# Patient Record
Sex: FEMALE | Race: WHITE | NOT HISPANIC OR LATINO | Employment: FULL TIME | URBAN - NONMETROPOLITAN AREA
[De-identification: names, ages, dates, MRNs, and addresses within clinical notes are randomized per-mention and may not be internally consistent; named-entity substitution may affect disease eponyms.]

---

## 2020-06-01 ENCOUNTER — OFFICE VISIT (OUTPATIENT)
Dept: URGENT CARE | Facility: PHYSICIAN GROUP | Age: 30
End: 2020-06-01
Payer: COMMERCIAL

## 2020-06-01 VITALS
SYSTOLIC BLOOD PRESSURE: 118 MMHG | WEIGHT: 210.5 LBS | HEART RATE: 77 BPM | BODY MASS INDEX: 35.07 KG/M2 | HEIGHT: 65 IN | OXYGEN SATURATION: 98 % | TEMPERATURE: 98.3 F | RESPIRATION RATE: 16 BRPM | DIASTOLIC BLOOD PRESSURE: 70 MMHG

## 2020-06-01 DIAGNOSIS — H10.33 ACUTE BACTERIAL CONJUNCTIVITIS OF BOTH EYES: ICD-10-CM

## 2020-06-01 PROCEDURE — 99203 OFFICE O/P NEW LOW 30 MIN: CPT | Performed by: NURSE PRACTITIONER

## 2020-06-01 RX ORDER — POLYMYXIN B SULFATE AND TRIMETHOPRIM 1; 10000 MG/ML; [USP'U]/ML
1 SOLUTION OPHTHALMIC EVERY 4 HOURS
Qty: 10 ML | Refills: 0 | Status: SHIPPED | OUTPATIENT
Start: 2020-06-01 | End: 2021-12-16

## 2020-06-01 SDOH — HEALTH STABILITY: MENTAL HEALTH: HOW OFTEN DO YOU HAVE A DRINK CONTAINING ALCOHOL?: 2-4 TIMES A MONTH

## 2020-06-01 ASSESSMENT — ENCOUNTER SYMPTOMS
EYE REDNESS: 1
PHOTOPHOBIA: 0
NAUSEA: 0
DIZZINESS: 0
HEADACHES: 0
VOMITING: 0
BLURRED VISION: 0
EYE PAIN: 0
DOUBLE VISION: 0
EYE DISCHARGE: 1

## 2020-06-01 ASSESSMENT — VISUAL ACUITY: OU: 1

## 2020-06-01 NOTE — LETTER
June 1, 2020         Patient: Madhavi Martinez   YOB: 1990   Date of Visit: 6/1/2020           To Whom it May Concern:    Madhavi Martinez was seen in my clinic on 6/1/2020.     If you have any questions or concerns, please don't hesitate to call.        Sincerely,           ALCIRA Bland.  Electronically Signed

## 2020-06-01 NOTE — PROGRESS NOTES
Subjective:     Madhavi Martinez  is a 30 y.o. female who presents for Conjunctivitis (her 3 yr old had it last week)       Conjunctivitis   This is a new problem. The current episode started yesterday. The problem occurs constantly. The problem has been gradually worsening. Pertinent negatives include no headaches, nausea, rash or vomiting. Associated symptoms comments: 10-year-old female patient reports urgent care for new problem.  States that her son had pinkeye last week and she woke up 1 day ago with red eyes, greenish drainage in her eyes glued shut this morning.  Denies fever, chills, nausea or vomiting denies any dizziness or changes in vision.  Has not taken anything over-the-counter for symptoms. Nothing aggravates the symptoms. She has tried nothing for the symptoms.     Review of Systems   Eyes: Positive for discharge and redness. Negative for blurred vision, double vision, photophobia and pain.   Gastrointestinal: Negative for nausea and vomiting.   Skin: Negative for itching and rash.   Neurological: Negative for dizziness and headaches.     History reviewed. No pertinent past medical history. History reviewed. No pertinent surgical history.   Social History     Socioeconomic History   • Marital status: Single     Spouse name: Not on file   • Number of children: Not on file   • Years of education: Not on file   • Highest education level: Not on file   Occupational History   • Not on file   Social Needs   • Financial resource strain: Not on file   • Food insecurity     Worry: Not on file     Inability: Not on file   • Transportation needs     Medical: Not on file     Non-medical: Not on file   Tobacco Use   • Smoking status: Never Smoker   • Smokeless tobacco: Never Used   Substance and Sexual Activity   • Alcohol use: Yes     Frequency: 2-4 times a month   • Drug use: Never   • Sexual activity: Not on file   Lifestyle   • Physical activity     Days per week: Not on file     Minutes per session: Not on  "file   • Stress: Not on file   Relationships   • Social connections     Talks on phone: Not on file     Gets together: Not on file     Attends Oriental orthodox service: Not on file     Active member of club or organization: Not on file     Attends meetings of clubs or organizations: Not on file     Relationship status: Not on file   • Intimate partner violence     Fear of current or ex partner: Not on file     Emotionally abused: Not on file     Physically abused: Not on file     Forced sexual activity: Not on file   Other Topics Concern   • Not on file   Social History Narrative   • Not on file    Patient has no known allergies.     Objective:   /70   Pulse 77   Temp 36.8 °C (98.3 °F) (Temporal)   Resp 16   Ht 1.651 m (5' 5\")   Wt 95.5 kg (210 lb 8 oz)   SpO2 98%   BMI 35.03 kg/m²   Physical Exam  Vitals signs reviewed.   Constitutional:       Appearance: Normal appearance.   HENT:      Right Ear: Tympanic membrane, ear canal and external ear normal.      Left Ear: Tympanic membrane, ear canal and external ear normal.      Mouth/Throat:      Lips: Pink.      Pharynx: Uvula midline.   Eyes:      General: Vision grossly intact. Gaze aligned appropriately.         Right eye: Discharge present.         Left eye: Discharge present.     Extraocular Movements: Extraocular movements intact.      Conjunctiva/sclera:      Right eye: Right conjunctiva is injected. Exudate present.      Left eye: Left conjunctiva is injected. Exudate present.      Pupils: Pupils are equal, round, and reactive to light.   Cardiovascular:      Rate and Rhythm: Normal rate and regular rhythm.      Heart sounds: Normal heart sounds.   Pulmonary:      Effort: Pulmonary effort is normal.      Breath sounds: Normal breath sounds.   Skin:     General: Skin is warm.   Neurological:      Mental Status: She is alert and oriented to person, place, and time.   Psychiatric:         Mood and Affect: Mood normal.         Behavior: Behavior normal.       "   Thought Content: Thought content normal.         Judgment: Judgment normal.          Assessment/Plan:     1. Acute bacterial conjunctivitis of both eyes  - polymixin-trimethoprim (POLYTRIM) 46563-3.1 UNIT/ML-% Solution; Place 1 Drop in both eyes every 4 hours.  Dispense: 10 mL; Refill: 0    Discussed physical examination findings as well as patient presentation is consistent with acute bacterial conjunctivitis.  Will order a antibiotic eyedrop and discussed abortive care including good hand hygiene, warm water compresses as well as lubricating eyedrops.    Supportive care, differential diagnoses, and indications for immediate follow-up discussed with patient.    Pathogenesis of diagnosis discussed including typical length and natural progression. Patient expresses understanding and agrees to plan.    Instructed patient to return to clinic for worsening symptoms or symptoms that persist for 7 to 10 days     Work note provided    Please note that this dictation was created using voice recognition software. I have made every reasonable attempt to correct obvious errors, but I expect that there are errors of grammar and possibly content that I did not discover before finalizing the note.

## 2021-12-16 ENCOUNTER — OFFICE VISIT (OUTPATIENT)
Dept: URGENT CARE | Facility: PHYSICIAN GROUP | Age: 31
End: 2021-12-16
Payer: COMMERCIAL

## 2021-12-16 VITALS
RESPIRATION RATE: 16 BRPM | BODY MASS INDEX: 36.32 KG/M2 | HEIGHT: 65 IN | TEMPERATURE: 97.4 F | WEIGHT: 218 LBS | SYSTOLIC BLOOD PRESSURE: 104 MMHG | HEART RATE: 75 BPM | OXYGEN SATURATION: 97 % | DIASTOLIC BLOOD PRESSURE: 70 MMHG

## 2021-12-16 DIAGNOSIS — L50.9 URTICARIA: ICD-10-CM

## 2021-12-16 PROCEDURE — 99213 OFFICE O/P EST LOW 20 MIN: CPT | Performed by: NURSE PRACTITIONER

## 2021-12-16 RX ORDER — TIMOLOL MALEATE 5 MG/ML
SOLUTION/ DROPS OPHTHALMIC
COMMUNITY
Start: 2021-11-22 | End: 2021-12-16

## 2021-12-16 RX ORDER — HYDROXYZINE HYDROCHLORIDE 25 MG/1
25 TABLET, FILM COATED ORAL 3 TIMES DAILY PRN
Qty: 30 TABLET | Refills: 3 | Status: SHIPPED | OUTPATIENT
Start: 2021-12-16 | End: 2022-01-05

## 2021-12-16 RX ORDER — METHYLPREDNISOLONE 4 MG/1
TABLET ORAL
Qty: 21 TABLET | Refills: 0 | Status: SHIPPED | OUTPATIENT
Start: 2021-12-16 | End: 2022-01-05

## 2021-12-16 RX ORDER — ULIPRISTAL ACETATE 30 MG/1
TABLET ORAL
COMMUNITY
Start: 2021-11-22 | End: 2021-12-16

## 2021-12-16 ASSESSMENT — ENCOUNTER SYMPTOMS
CHILLS: 0
SORE THROAT: 0
FEVER: 0
DIZZINESS: 0
HEADACHES: 0

## 2021-12-16 NOTE — LETTER
December 16, 2021        Madhavi [address] [place] [address] [place] [address]        Madhavi was seen in our clinic today and she is excused from work for yesterday. She has a rash that is chronic but not contagious.   If you have any questions or concerns, please don't hesitate to call.        Sincerely,        DIANNE [email or web address].    Electronically Signed

## 2021-12-16 NOTE — PROGRESS NOTES
Subjective     Madhavi Martinez is a 31 y.o. female who presents with Rash (Uticartia flare up, x1day)            HPI   New problem.  Patient is a 31-year-old female who presents with a urticarial flare up since yesterday.  She reports that she has chronic hives and has had these for several years.  She reports breakout yesterday that started on her scalp and is in various locations.  The rash is itchy.  She denies fever, chills, joint pain, or sore throat.  She has been taking an  prescription of hydroxyzine for the symptoms.    Patient has no known allergies.  No current outpatient medications on file prior to visit.     No current facility-administered medications on file prior to visit.     Social History     Socioeconomic History   • Marital status: Single     Spouse name: Not on file   • Number of children: Not on file   • Years of education: Not on file   • Highest education level: Not on file   Occupational History   • Not on file   Tobacco Use   • Smoking status: Never Smoker   • Smokeless tobacco: Never Used   Substance and Sexual Activity   • Alcohol use: Yes   • Drug use: Never   • Sexual activity: Not on file   Other Topics Concern   • Not on file   Social History Narrative   • Not on file     Social Determinants of Health     Financial Resource Strain:    • Difficulty of Paying Living Expenses: Not on file   Food Insecurity:    • Worried About Running Out of Food in the Last Year: Not on file   • Ran Out of Food in the Last Year: Not on file   Transportation Needs:    • Lack of Transportation (Medical): Not on file   • Lack of Transportation (Non-Medical): Not on file   Physical Activity:    • Days of Exercise per Week: Not on file   • Minutes of Exercise per Session: Not on file   Stress:    • Feeling of Stress : Not on file   Social Connections:    • Frequency of Communication with Friends and Family: Not on file   • Frequency of Social Gatherings with Friends and Family: Not on file   •  "Attends Buddhism Services: Not on file   • Active Member of Clubs or Organizations: Not on file   • Attends Club or Organization Meetings: Not on file   • Marital Status: Not on file   Intimate Partner Violence:    • Fear of Current or Ex-Partner: Not on file   • Emotionally Abused: Not on file   • Physically Abused: Not on file   • Sexually Abused: Not on file   Housing Stability:    • Unable to Pay for Housing in the Last Year: Not on file   • Number of Places Lived in the Last Year: Not on file   • Unstable Housing in the Last Year: Not on file     Breast Cancer-related family history is not on file.      Review of Systems   Constitutional: Negative for chills, fever and malaise/fatigue.   HENT: Negative for sore throat.    Musculoskeletal: Negative for joint pain.   Skin: Positive for itching and rash.   Neurological: Negative for dizziness and headaches.              Objective     /70   Pulse 75   Temp 36.3 °C (97.4 °F) (Temporal)   Resp 16   Ht 1.651 m (5' 5\")   Wt 98.9 kg (218 lb)   SpO2 97%   BMI 36.28 kg/m²      Physical Exam  Vitals and nursing note reviewed.   Constitutional:       Appearance: Normal appearance. She is not ill-appearing.   Cardiovascular:      Rate and Rhythm: Normal rate and regular rhythm.      Heart sounds: No murmur heard.      Pulmonary:      Effort: Pulmonary effort is normal.      Breath sounds: Normal breath sounds.   Musculoskeletal:         General: Normal range of motion.   Skin:     General: Skin is warm and dry.      Findings: Rash present. Rash is urticarial.   Neurological:      General: No focal deficit present.      Mental Status: She is alert and oriented to person, place, and time.   Psychiatric:         Mood and Affect: Mood normal.                             Assessment & Plan        1. Urticaria  hydrOXYzine HCl (ATARAX) 25 MG Tab    methylPREDNISolone (MEDROL DOSEPAK) 4 MG Tablet Therapy Pack     Patient given refill on hydroxyzine as well as " medrol.  Follow up as needed.

## 2022-01-05 ENCOUNTER — OFFICE VISIT (OUTPATIENT)
Dept: URGENT CARE | Facility: PHYSICIAN GROUP | Age: 32
End: 2022-01-05
Payer: COMMERCIAL

## 2022-01-05 VITALS
OXYGEN SATURATION: 98 % | HEIGHT: 65 IN | DIASTOLIC BLOOD PRESSURE: 68 MMHG | TEMPERATURE: 97.6 F | WEIGHT: 212 LBS | BODY MASS INDEX: 35.32 KG/M2 | HEART RATE: 60 BPM | SYSTOLIC BLOOD PRESSURE: 110 MMHG | RESPIRATION RATE: 14 BRPM

## 2022-01-05 DIAGNOSIS — L50.9 HIVES OF UNKNOWN ORIGIN: ICD-10-CM

## 2022-01-05 DIAGNOSIS — R21 RASH: ICD-10-CM

## 2022-01-05 PROCEDURE — 99213 OFFICE O/P EST LOW 20 MIN: CPT | Performed by: FAMILY MEDICINE

## 2022-01-05 RX ORDER — PREDNISONE 20 MG/1
40 TABLET ORAL EVERY MORNING
Qty: 12 TABLET | Refills: 0 | Status: SHIPPED | OUTPATIENT
Start: 2022-01-05 | End: 2022-01-11

## 2022-01-05 RX ORDER — MONTELUKAST SODIUM 10 MG/1
10 TABLET ORAL EVERY EVENING
Qty: 30 TABLET | Refills: 0 | Status: SHIPPED | OUTPATIENT
Start: 2022-01-05 | End: 2022-02-07 | Stop reason: SDUPTHER

## 2022-01-05 NOTE — PROGRESS NOTES
"Subjective     Madhavi Martinez is a 32 y.o. female who presents with Rash (chronic uticaria pt states, follow up, medication reaction with cream prescribed)    - This is a pleasant and nontoxic appearing 32 y.o. female who has come to the walk-in clinic today for:    #1) on/off hives for acpl years. Has been acting up a lot past 2 weeks. Itchy hives, move around.     Seen recently in  and given Medrol spike, notes reviewed. Says this helped a lot.     No associated NVFC      ALLERGIES:  Patient has no known allergies.     PMH:  History reviewed. No pertinent past medical history.     PSH:  History reviewed. No pertinent surgical history.    MEDS:    Current Outpatient Medications:   •  predniSONE (DELTASONE) 20 MG Tab, Take 2 Tablets by mouth every morning for 6 days., Disp: 12 Tablet, Rfl: 0  •  montelukast (SINGULAIR) 10 MG Tab, Take 1 Tablet by mouth every evening., Disp: 30 Tablet, Rfl: 0    ** I have documented what I find to be significant in regards to past medical, social, family and surgical history  in my HPI or under PMH/PSH/FH review section, otherwise it is noncontributory **             HPI    Review of Systems   Skin: Positive for itching and rash.   All other systems reviewed and are negative.             Objective     /68   Pulse 60   Temp 36.4 °C (97.6 °F)   Resp 14   Ht 1.651 m (5' 5\")   Wt 96.2 kg (212 lb)   SpO2 98%   BMI 35.28 kg/m²      Physical Exam  Vitals and nursing note reviewed.   Constitutional:       General: She is not in acute distress.     Appearance: Normal appearance. She is well-developed.   HENT:      Head: Normocephalic and atraumatic.      Mouth/Throat:      Mouth: Mucous membranes are moist.      Pharynx: Oropharynx is clear.   Eyes:      General: No scleral icterus.  Cardiovascular:      Heart sounds: Normal heart sounds. No murmur heard.      Pulmonary:      Effort: Pulmonary effort is normal. No respiratory distress.   Skin:     Coloration: Skin is not " jaundiced or pale.      Findings: Rash ( hive like rash on neck, upper back and upper arms ) present.   Neurological:      Mental Status: She is alert.      Motor: No abnormal muscle tone.   Psychiatric:         Mood and Affect: Mood normal.         Behavior: Behavior normal.         Assessment & Plan       1. Rash  predniSONE (DELTASONE) 20 MG Tab    montelukast (SINGULAIR) 10 MG Tab    Referral to Allergy    Referral to Dermatology   2. Hives of unknown origin  predniSONE (DELTASONE) 20 MG Tab    montelukast (SINGULAIR) 10 MG Tab    Referral to Allergy    Referral to Dermatology       - Dx, plan & d/c instructions discussed   - Rest, stay hydrated, OTC Xyzal daily  - E.R. precautions discussed     Asked to kindly follow up with their PCP's office in 5-7 days for a recheck, ER if not improving or feeling/getting worse.    Any realistic side effects of medications that may have been given today reviewed.     Patient left in stable condition     Pertinent prior office visit notes in [facility] have been reviewed by me today on day of this visit.

## 2022-01-13 ENCOUNTER — OFFICE VISIT (OUTPATIENT)
Dept: MEDICAL GROUP | Facility: PHYSICIAN GROUP | Age: 32
End: 2022-01-13
Payer: COMMERCIAL

## 2022-01-13 VITALS
SYSTOLIC BLOOD PRESSURE: 122 MMHG | OXYGEN SATURATION: 97 % | HEIGHT: 65 IN | DIASTOLIC BLOOD PRESSURE: 80 MMHG | WEIGHT: 219.4 LBS | HEART RATE: 73 BPM | TEMPERATURE: 96.8 F | BODY MASS INDEX: 36.55 KG/M2 | RESPIRATION RATE: 16 BRPM

## 2022-01-13 DIAGNOSIS — M25.50 ARTHRALGIA, UNSPECIFIED JOINT: ICD-10-CM

## 2022-01-13 DIAGNOSIS — R23.9 UNSPECIFIED SKIN CHANGES: ICD-10-CM

## 2022-01-13 DIAGNOSIS — Z76.89 ENCOUNTER TO ESTABLISH CARE: ICD-10-CM

## 2022-01-13 DIAGNOSIS — E66.9 OBESITY (BMI 30-39.9): ICD-10-CM

## 2022-01-13 DIAGNOSIS — E55.9 VITAMIN D DEFICIENCY: ICD-10-CM

## 2022-01-13 DIAGNOSIS — Z13.220 SCREENING, LIPID: ICD-10-CM

## 2022-01-13 DIAGNOSIS — R21 RASH AND OTHER NONSPECIFIC SKIN ERUPTION: ICD-10-CM

## 2022-01-13 DIAGNOSIS — R53.82 CHRONIC FATIGUE: ICD-10-CM

## 2022-01-13 DIAGNOSIS — R23.9 RECENT SKIN CHANGES: ICD-10-CM

## 2022-01-13 PROCEDURE — 99214 OFFICE O/P EST MOD 30 MIN: CPT | Performed by: FAMILY MEDICINE

## 2022-01-13 RX ORDER — TRIAMCINOLONE ACETONIDE 1 MG/G
OINTMENT TOPICAL 2 TIMES DAILY
COMMUNITY
End: 2022-02-28

## 2022-01-13 RX ORDER — HYDROXYZINE HYDROCHLORIDE 25 MG/1
25 TABLET, FILM COATED ORAL 3 TIMES DAILY PRN
COMMUNITY
End: 2022-02-07 | Stop reason: SDUPTHER

## 2022-01-13 RX ORDER — PREDNISONE 20 MG/1
20 TABLET ORAL DAILY
COMMUNITY
End: 2022-02-28

## 2022-01-13 ASSESSMENT — PATIENT HEALTH QUESTIONNAIRE - PHQ9
CLINICAL INTERPRETATION OF PHQ2 SCORE: 2
SUM OF ALL RESPONSES TO PHQ QUESTIONS 1-9: 7
5. POOR APPETITE OR OVEREATING: 1 - SEVERAL DAYS

## 2022-01-13 NOTE — LETTER
[facility]  Pcp Pt States None  No address on file  Fax: [phone]   Authorization for Release/Disclosure of   Protected Health Information   Name: SLOANE SHIELDS : 1990 SSN: [id number]   Address: [address] [place] [address] [address] [place] [address] Phone:    [phone] (home)    I authorize the entity listed below to release/disclose the PHI below to:   [facility]/Pcp Pt States None and Lex Guevara M.D.   Provider or Entity Name:     Address   City, State, [facility]   Phone:      Fax:     Reason for request: continuity of care   Information to be released:    [  ] LAST COLONOSCOPY,  including any PATH REPORT and follow-up  [  ] LAST FIT/COLOGUARD RESULT [  ] LAST DEXA  [  ] LAST MAMMOGRAM  [  ] LAST PAP  [  ] LAST LABS [  ] RETINA EXAM REPORT  [  ] IMMUNIZATION RECORDS  [  ] Release all info      [  ] Check here and initial the line next to each item to release ALL health information INCLUDING  _____ Care and treatment for drug and / or alcohol abuse  _____ HIV testing, infection status, or AIDS  _____ Genetic Testing    DATES OF SERVICE OR TIME PERIOD TO BE DISCLOSED: _____________  I understand and acknowledge that:  * This Authorization may be revoked at any time by you in writing, except if your health information has already been used or disclosed.  * Your health information that will be used or disclosed as a result of you signing this authorization could be re-disclosed by the recipient. If this occurs, your re-disclosed health information may no longer be protected by State or Federal laws.  * You may refuse to sign this Authorization. Your refusal will not affect your ability to obtain treatment.  * This Authorization becomes effective upon signing and will  on (date) __________.      If no date is indicated, this Authorization will  one (1) year from the signature date.    Name: Sloane Shields    Signature:   Date:     2022       PLEASE FAX REQUESTED RECORDS BACK TO: [phone]

## 2022-01-13 NOTE — ASSESSMENT & PLAN NOTE
Patient with intermittent episodes or flares of urticaria, burning erythema, hives, joint pain: hands, elbows, wrists, knees, whole body pain and feverish, trouble sleeping with the pain. Eyelids will swell shut.   Sometimes she has migraines, ear pain, has no vision loss.     This started after her husbands suicide Jan 2018. Episodes started 3 weeks after his death when his family started to take her to court trying to get custody of her child who is currently 5 yrs old. Since then relationship with her inlaws have improved, but the symptoms and flares continue.   Stress or any fatigue will trigger her symptoms.   She also gained weight after the stress from her husbands passing.   Will check baseline labs as well as esr, crp, rf, ccp, alejandro  She has appts and referral with derm and allergy/immunology

## 2022-01-14 NOTE — PROGRESS NOTES
"Subjective:   Madhavi Martinez is a 32 y.o. female here today for evaluation and management of:     Unspecified skin changes  Patient with intermittent episodes or flares of urticaria, burning erythema, hives, joint pain: hands, elbows, wrists, knees, whole body pain and feverish, trouble sleeping with the pain. Eyelids will swell shut.   Sometimes she has migraines, ear pain, has no vision loss.     This started after her husbands suicide Jan 2018. Episodes started 3 weeks after his death when his family started to take her to court trying to get custody of her child who is currently 5 yrs old. Since then relationship with her inlaws have improved, but the symptoms and flares continue.   Stress or any fatigue will trigger her symptoms.   She also gained weight after the stress from her husbands passing.   Will check baseline labs as well as esr, crp, rf, ccp, alejandro  She has appts and referral with derm and allergy/immunology         Current medicines (including changes today)  Current Outpatient Medications   Medication Sig Dispense Refill   • predniSONE (DELTASONE) 20 MG Tab Take 20 mg by mouth every day.     • hydrOXYzine HCl (ATARAX) 25 MG Tab Take 25 mg by mouth 3 times a day as needed for Itching.     • triamcinolone acetonide (KENALOG) 0.1 % Ointment Apply  topically 2 times a day.     • montelukast (SINGULAIR) 10 MG Tab Take 1 Tablet by mouth every evening. 30 Tablet 0     No current facility-administered medications for this visit.     She  has no past medical history on file.    ROS  No chest pain, no shortness of breath, no abdominal pain       Objective:     /80   Pulse 73   Temp 36 °C (96.8 °F) (Temporal)   Resp 16   Ht 1.651 m (5' 5\")   Wt 99.5 kg (219 lb 6.4 oz)   SpO2 97%  Body mass index is 36.51 kg/m².   Physical Exam:  Constitutional: Alert, no distress.  Skin: Warm, dry, good turgor, no rashes in visible areas.  Eye: Equal, round and reactive, conjunctiva clear, lids normal.  ENMT: Lips " without lesions, good dentition, oropharynx clear.  Neck: Trachea midline, no masses, no thyromegaly. No cervical or supraclavicular lymphadenopathy  Respiratory: Unlabored respiratory effort, lungs clear to auscultation, no wheezes, no ronchi.  Cardiovascular: Normal S1, S2, no murmur, no edema.  Abdomen: Soft, non-tender, no masses, no hepatosplenomegaly.  Psych: Alert and oriented x3, normal affect and mood.        Assessment and Plan:   The following treatment plan was discussed    1. Encounter to establish care      2. Chronic fatigue    - CBC WITH DIFFERENTIAL; Future  - Comp Metabolic Panel; Future  - TSH WITH REFLEX TO FT4; Future    3. Vitamin D deficiency    - VITAMIN D,25 HYDROXY; Future    4. Screening, lipid    - Lipid Profile; Future    5. Recent skin changes    - CBC WITH DIFFERENTIAL; Future  - Comp Metabolic Panel; Future  - TSH WITH REFLEX TO FT4; Future  - URINALYSIS,CULTURE IF INDICATED; Future    6. Unspecified skin changes    - URINALYSIS,CULTURE IF INDICATED; Future    7. Rash and other nonspecific skin eruption    - Sed Rate; Future  - CRP QUANTITIVE (NON-CARDIAC); Future  - RHEUMATOID ARTHRITIS FACTOR; Future  - CCP ANTIBODY; Future  - DENISSE REFLEXIVE PROFILE; Future  - LYME DISEASE AB TOTAL/IGM  - URINALYSIS,CULTURE IF INDICATED; Future    8. Arthralgia, unspecified joint    - Sed Rate; Future  - CRP QUANTITIVE (NON-CARDIAC); Future  - RHEUMATOID ARTHRITIS FACTOR; Future  - CCP ANTIBODY; Future  - DENISSE REFLEXIVE PROFILE; Future  - LYME DISEASE AB TOTAL/IGM  - URINALYSIS,CULTURE IF INDICATED; Future    9. Obesity (BMI 30-39.9)    - Patient identified as having weight management issue.  Appropriate orders and counseling given.      Followup: Return in about 3 months (around 4/13/2022), or if symptoms worsen or fail to improve.

## 2022-01-22 ENCOUNTER — HOSPITAL ENCOUNTER (OUTPATIENT)
Dept: LAB | Facility: MEDICAL CENTER | Age: 32
End: 2022-01-22
Attending: FAMILY MEDICINE
Payer: COMMERCIAL

## 2022-01-22 DIAGNOSIS — R53.82 CHRONIC FATIGUE: ICD-10-CM

## 2022-01-22 DIAGNOSIS — M25.50 ARTHRALGIA, UNSPECIFIED JOINT: ICD-10-CM

## 2022-01-22 DIAGNOSIS — E55.9 VITAMIN D DEFICIENCY: ICD-10-CM

## 2022-01-22 DIAGNOSIS — Z13.220 SCREENING, LIPID: ICD-10-CM

## 2022-01-22 DIAGNOSIS — R21 RASH AND OTHER NONSPECIFIC SKIN ERUPTION: ICD-10-CM

## 2022-01-22 DIAGNOSIS — R23.9 RECENT SKIN CHANGES: ICD-10-CM

## 2022-01-22 DIAGNOSIS — R23.9 UNSPECIFIED SKIN CHANGES: ICD-10-CM

## 2022-01-22 LAB
25(OH)D3 SERPL-MCNC: 20 NG/ML (ref 30–100)
ALBUMIN SERPL BCP-MCNC: 4.2 G/DL (ref 3.2–4.9)
ALBUMIN/GLOB SERPL: 1.4 G/DL
ALP SERPL-CCNC: 68 U/L (ref 30–99)
ALT SERPL-CCNC: 16 U/L (ref 2–50)
ANION GAP SERPL CALC-SCNC: 12 MMOL/L (ref 7–16)
APPEARANCE UR: CLEAR
AST SERPL-CCNC: 19 U/L (ref 12–45)
BACTERIA #/AREA URNS HPF: ABNORMAL /HPF
BASOPHILS # BLD AUTO: 0.4 % (ref 0–1.8)
BASOPHILS # BLD: 0.04 K/UL (ref 0–0.12)
BILIRUB SERPL-MCNC: 0.2 MG/DL (ref 0.1–1.5)
BILIRUB UR QL STRIP.AUTO: NEGATIVE
BUN SERPL-MCNC: 14 MG/DL (ref 8–22)
CALCIUM SERPL-MCNC: 9.1 MG/DL (ref 8.5–10.5)
CHLORIDE SERPL-SCNC: 107 MMOL/L (ref 96–112)
CHOLEST SERPL-MCNC: 186 MG/DL (ref 100–199)
CO2 SERPL-SCNC: 21 MMOL/L (ref 20–33)
COLOR UR: YELLOW
CREAT SERPL-MCNC: 0.69 MG/DL (ref 0.5–1.4)
CRP SERPL HS-MCNC: 2.07 MG/DL (ref 0–0.75)
EOSINOPHIL # BLD AUTO: 0.22 K/UL (ref 0–0.51)
EOSINOPHIL NFR BLD: 2 % (ref 0–6.9)
EPI CELLS #/AREA URNS HPF: ABNORMAL /HPF
ERYTHROCYTE [DISTWIDTH] IN BLOOD BY AUTOMATED COUNT: 44 FL (ref 35.9–50)
ERYTHROCYTE [SEDIMENTATION RATE] IN BLOOD BY WESTERGREN METHOD: 19 MM/HOUR (ref 0–25)
FASTING STATUS PATIENT QL REPORTED: NORMAL
GLOBULIN SER CALC-MCNC: 3 G/DL (ref 1.9–3.5)
GLUCOSE SERPL-MCNC: 110 MG/DL (ref 65–99)
GLUCOSE UR STRIP.AUTO-MCNC: NEGATIVE MG/DL
HCT VFR BLD AUTO: 38.2 % (ref 37–47)
HDLC SERPL-MCNC: 52 MG/DL
HGB BLD-MCNC: 12.5 G/DL (ref 12–16)
HYALINE CASTS #/AREA URNS LPF: ABNORMAL /LPF
IMM GRANULOCYTES # BLD AUTO: 0.03 K/UL (ref 0–0.11)
IMM GRANULOCYTES NFR BLD AUTO: 0.3 % (ref 0–0.9)
KETONES UR STRIP.AUTO-MCNC: NEGATIVE MG/DL
LDLC SERPL CALC-MCNC: 104 MG/DL
LEUKOCYTE ESTERASE UR QL STRIP.AUTO: ABNORMAL
LYMPHOCYTES # BLD AUTO: 3.32 K/UL (ref 1–4.8)
LYMPHOCYTES NFR BLD: 29.5 % (ref 22–41)
MCH RBC QN AUTO: 29.5 PG (ref 27–33)
MCHC RBC AUTO-ENTMCNC: 32.7 G/DL (ref 33.6–35)
MCV RBC AUTO: 90.1 FL (ref 81.4–97.8)
MICRO URNS: ABNORMAL
MONOCYTES # BLD AUTO: 0.69 K/UL (ref 0–0.85)
MONOCYTES NFR BLD AUTO: 6.1 % (ref 0–13.4)
NEUTROPHILS # BLD AUTO: 6.95 K/UL (ref 2–7.15)
NEUTROPHILS NFR BLD: 61.7 % (ref 44–72)
NITRITE UR QL STRIP.AUTO: NEGATIVE
NRBC # BLD AUTO: 0 K/UL
NRBC BLD-RTO: 0 /100 WBC
PH UR STRIP.AUTO: 5.5 [PH] (ref 5–8)
PLATELET # BLD AUTO: 311 K/UL (ref 164–446)
PMV BLD AUTO: 10.7 FL (ref 9–12.9)
POTASSIUM SERPL-SCNC: 4.1 MMOL/L (ref 3.6–5.5)
PROT SERPL-MCNC: 7.2 G/DL (ref 6–8.2)
PROT UR QL STRIP: NEGATIVE MG/DL
RBC # BLD AUTO: 4.24 M/UL (ref 4.2–5.4)
RBC # URNS HPF: ABNORMAL /HPF
RBC UR QL AUTO: NEGATIVE
RHEUMATOID FACT SER IA-ACNC: <10 IU/ML (ref 0–14)
SODIUM SERPL-SCNC: 140 MMOL/L (ref 135–145)
SP GR UR STRIP.AUTO: 1.02
TRIGL SERPL-MCNC: 148 MG/DL (ref 0–149)
TSH SERPL DL<=0.005 MIU/L-ACNC: 1.75 UIU/ML (ref 0.38–5.33)
UROBILINOGEN UR STRIP.AUTO-MCNC: 0.2 MG/DL
WBC # BLD AUTO: 11.3 K/UL (ref 4.8–10.8)
WBC #/AREA URNS HPF: ABNORMAL /HPF

## 2022-01-22 PROCEDURE — 82306 VITAMIN D 25 HYDROXY: CPT

## 2022-01-22 PROCEDURE — 80061 LIPID PANEL: CPT

## 2022-01-22 PROCEDURE — 86431 RHEUMATOID FACTOR QUANT: CPT

## 2022-01-22 PROCEDURE — 81001 URINALYSIS AUTO W/SCOPE: CPT

## 2022-01-22 PROCEDURE — 86140 C-REACTIVE PROTEIN: CPT

## 2022-01-22 PROCEDURE — 86200 CCP ANTIBODY: CPT

## 2022-01-22 PROCEDURE — 85025 COMPLETE CBC W/AUTO DIFF WBC: CPT

## 2022-01-22 PROCEDURE — 85652 RBC SED RATE AUTOMATED: CPT

## 2022-01-22 PROCEDURE — 86038 ANTINUCLEAR ANTIBODIES: CPT

## 2022-01-22 PROCEDURE — 80053 COMPREHEN METABOLIC PANEL: CPT

## 2022-01-22 PROCEDURE — 36415 COLL VENOUS BLD VENIPUNCTURE: CPT

## 2022-01-22 PROCEDURE — 84443 ASSAY THYROID STIM HORMONE: CPT

## 2022-01-25 LAB
CCP IGG SERPL-ACNC: 32 UNITS (ref 0–19)
NUCLEAR IGG SER QL IA: NORMAL

## 2022-01-27 DIAGNOSIS — M79.641 PAIN IN BOTH HANDS: ICD-10-CM

## 2022-01-27 DIAGNOSIS — M79.642 PAIN IN BOTH HANDS: ICD-10-CM

## 2022-01-27 DIAGNOSIS — G89.29 CHRONIC BILATERAL LOW BACK PAIN WITHOUT SCIATICA: ICD-10-CM

## 2022-01-27 DIAGNOSIS — M54.50 CHRONIC BILATERAL LOW BACK PAIN WITHOUT SCIATICA: ICD-10-CM

## 2022-01-27 DIAGNOSIS — M79.672 PAIN IN BOTH FEET: ICD-10-CM

## 2022-01-27 DIAGNOSIS — M79.671 PAIN IN BOTH FEET: ICD-10-CM

## 2022-01-27 NOTE — RESULT ENCOUNTER NOTE
Released to Juaquin Hendrickson,  Your labs show high CCP and CRP these are indicative of possibly Rheumatoid arthritis or psoriatic arthritis. I've ordered xrays of hands, feet and SI joints which are required before referral to rheumatology.   Let me know when these are done and I will place the rheum referral. All other labs look good.   Lex Guevara M.D.

## 2022-02-07 DIAGNOSIS — L50.9 HIVES OF UNKNOWN ORIGIN: ICD-10-CM

## 2022-02-07 DIAGNOSIS — R21 RASH AND OTHER NONSPECIFIC SKIN ERUPTION: ICD-10-CM

## 2022-02-07 DIAGNOSIS — R21 RASH: ICD-10-CM

## 2022-02-07 RX ORDER — MONTELUKAST SODIUM 10 MG/1
10 TABLET ORAL EVERY EVENING
Qty: 30 TABLET | Refills: 0 | Status: SHIPPED | OUTPATIENT
Start: 2022-02-07 | End: 2022-03-29

## 2022-02-07 RX ORDER — HYDROXYZINE HYDROCHLORIDE 25 MG/1
25 TABLET, FILM COATED ORAL 3 TIMES DAILY PRN
Qty: 30 TABLET | Refills: 3 | Status: SHIPPED | OUTPATIENT
Start: 2022-02-07 | End: 2022-02-08 | Stop reason: SDUPTHER

## 2022-02-07 NOTE — TELEPHONE ENCOUNTER
Received request via: Patient    Was the patient seen in the last year in this department? Yes   Last OV 1/13/22    Does the patient have an active prescription (recently filled or refills available) for medication(s) requested? No    Requested Prescriptions     Pending Prescriptions Disp Refills   • hydrOXYzine HCl (ATARAX) 25 MG Tab 30 Tablet 3     Sig: Take 1 Tablet by mouth 3 times a day as needed for Itching.   • montelukast (SINGULAIR) 10 MG Tab 30 Tablet 0     Sig: Take 1 Tablet by mouth every evening.       
[phone]

## 2022-02-08 DIAGNOSIS — R21 RASH AND OTHER NONSPECIFIC SKIN ERUPTION: ICD-10-CM

## 2022-02-08 RX ORDER — HYDROXYZINE HYDROCHLORIDE 25 MG/1
25 TABLET, FILM COATED ORAL 3 TIMES DAILY PRN
Qty: 30 TABLET | Refills: 3 | Status: SHIPPED | OUTPATIENT
Start: 2022-02-08 | End: 2022-09-22 | Stop reason: SDUPTHER

## 2022-02-11 ENCOUNTER — APPOINTMENT (OUTPATIENT)
Dept: RADIOLOGY | Facility: MEDICAL CENTER | Age: 32
End: 2022-02-11
Attending: FAMILY MEDICINE
Payer: COMMERCIAL

## 2022-02-11 ENCOUNTER — APPOINTMENT (RX ONLY)
Dept: URBAN - METROPOLITAN AREA CLINIC 22 | Facility: CLINIC | Age: 32
Setting detail: DERMATOLOGY
End: 2022-02-11

## 2022-02-11 ENCOUNTER — RX ONLY (OUTPATIENT)
Age: 32
Setting detail: RX ONLY
End: 2022-02-11

## 2022-02-11 DIAGNOSIS — L50.1 IDIOPATHIC URTICARIA: ICD-10-CM

## 2022-02-11 PROCEDURE — ? ADDITIONAL NOTES

## 2022-02-11 PROCEDURE — ? REFERRAL

## 2022-02-11 PROCEDURE — 99204 OFFICE O/P NEW MOD 45 MIN: CPT

## 2022-02-11 PROCEDURE — ? PRESCRIPTION

## 2022-02-11 PROCEDURE — ? COUNSELING

## 2022-02-11 PROCEDURE — ? TREATMENT REGIMEN

## 2022-02-11 RX ORDER — CETIRIZINE HCL 1 MG/ML
SOLUTION, ORAL ORAL QD
Qty: 60 | Refills: 5 | Status: ERX | COMMUNITY
Start: 2022-02-11

## 2022-02-11 RX ORDER — FAMOTIDINE 20 MG/1
1 TABLET, FILM COATED ORAL BID
Qty: 60 | Refills: 5 | Status: ERX | COMMUNITY
Start: 2022-02-11

## 2022-02-11 RX ORDER — FAMOTIDINE 40 MG/1
TABLET, FILM COATED ORAL BID
Qty: 60 | Refills: 0 | Status: CANCELLED

## 2022-02-11 RX ADMIN — Medication 1: at 00:00

## 2022-02-11 ASSESSMENT — LOCATION DETAILED DESCRIPTION DERM
LOCATION DETAILED: LEFT ANTERIOR DISTAL THIGH
LOCATION DETAILED: LEFT ANTERIOR DISTAL UPPER ARM
LOCATION DETAILED: RIGHT DISTAL POSTERIOR THIGH
LOCATION DETAILED: RIGHT ANTERIOR DISTAL THIGH
LOCATION DETAILED: LEFT DISTAL POSTERIOR THIGH
LOCATION DETAILED: LEFT LATERAL ABDOMEN
LOCATION DETAILED: LEFT MEDIAL UPPER BACK

## 2022-02-11 ASSESSMENT — LOCATION SIMPLE DESCRIPTION DERM
LOCATION SIMPLE: LEFT POSTERIOR THIGH
LOCATION SIMPLE: LEFT THIGH
LOCATION SIMPLE: LEFT UPPER ARM
LOCATION SIMPLE: RIGHT THIGH
LOCATION SIMPLE: RIGHT POSTERIOR THIGH
LOCATION SIMPLE: ABDOMEN
LOCATION SIMPLE: LEFT UPPER BACK

## 2022-02-11 ASSESSMENT — LOCATION ZONE DERM
LOCATION ZONE: TRUNK
LOCATION ZONE: ARM
LOCATION ZONE: LEG

## 2022-02-11 NOTE — HPI: RASH
What Type Of Note Output Would You Prefer (Optional)?: Bullet Format
Is The Patient Presenting As Previously Scheduled?: Yes
How Severe Is Your Rash?: severe
Is This A New Presentation, Or A Follow-Up?: Referral for Rash
Who Is Your Referring Provider?: Urgent care

## 2022-02-11 NOTE — PROCEDURE: ADDITIONAL NOTES
Additional Notes: Has had urticaria on and off since 2018.  Had been clear for 1.5 years until the last few months when she started getting them again\\nStates she saw her allergist but was unable to get resolution.   Itch precedes rash/welts.   Scratching causes welts. \\nWould like to see new allergist as well. \\nStart Zyrtec BID and Pepcid BID.   Sent to pharmacy.
Detail Level: Simple
Render Risk Assessment In Note?: no

## 2022-02-11 NOTE — PROCEDURE: TREATMENT REGIMEN
Plan: Coordinate care with a different allergist as she is currently not happy with current one. Pepcid bid and Zyrtec bid
Initiate Treatment: Zyrtec BID, Pepcid
Detail Level: Zone

## 2022-02-28 ENCOUNTER — OFFICE VISIT (OUTPATIENT)
Dept: URGENT CARE | Facility: PHYSICIAN GROUP | Age: 32
End: 2022-02-28
Payer: COMMERCIAL

## 2022-02-28 VITALS
RESPIRATION RATE: 15 BRPM | TEMPERATURE: 97.6 F | SYSTOLIC BLOOD PRESSURE: 108 MMHG | HEIGHT: 63 IN | WEIGHT: 221 LBS | DIASTOLIC BLOOD PRESSURE: 76 MMHG | OXYGEN SATURATION: 98 % | HEART RATE: 87 BPM | BODY MASS INDEX: 39.16 KG/M2

## 2022-02-28 DIAGNOSIS — R21 RASH AND NONSPECIFIC SKIN ERUPTION: ICD-10-CM

## 2022-02-28 PROCEDURE — 99213 OFFICE O/P EST LOW 20 MIN: CPT | Performed by: PHYSICIAN ASSISTANT

## 2022-02-28 RX ORDER — TRIAMCINOLONE ACETONIDE 40 MG/ML
40 INJECTION, SUSPENSION INTRA-ARTICULAR; INTRAMUSCULAR ONCE
Status: COMPLETED | OUTPATIENT
Start: 2022-02-28 | End: 2022-02-28

## 2022-02-28 RX ADMIN — TRIAMCINOLONE ACETONIDE 40 MG: 40 INJECTION, SUSPENSION INTRA-ARTICULAR; INTRAMUSCULAR at 12:52

## 2022-02-28 ASSESSMENT — ENCOUNTER SYMPTOMS
VOMITING: 0
FEVER: 0
CHILLS: 0
NAUSEA: 0

## 2022-02-28 ASSESSMENT — FIBROSIS 4 INDEX: FIB4 SCORE: 0.46

## 2022-02-28 NOTE — PROGRESS NOTES
"Subjective:   Madhavi Martinez  is a 32 y.o. female who presents for Rash (Rash on back)      Rash  Pertinent negatives include no fever or vomiting.   Patient presents urgent care noting flareup of her urticarial rash. She notes past medical history of dermatographia. She has seen allergist and dermatologist. Both of discussed trial of a triamcinolone injection. Patient has recently been treated with prednisone orally. She reports some challenging feelings of anxiety and depression exacerbated while taking that medication. She does regularly use antihistamines for her rash. She does have a follow-up with dermatology. She is on FMLA from work. She requests a triamcinolone injection while in clinic today.    Review of Systems   Constitutional: Negative for chills and fever.   Gastrointestinal: Negative for nausea and vomiting.   Skin: Positive for itching and rash.       No Known Allergies     Objective:   /76   Pulse 87   Temp 36.4 °C (97.6 °F) (Temporal)   Resp 15   Ht 1.6 m (5' 3\")   Wt 100 kg (221 lb)   SpO2 98%   BMI 39.15 kg/m²     Physical Exam  Vitals and nursing note reviewed.   Constitutional:       General: She is not in acute distress.     Appearance: She is well-developed. She is not diaphoretic.   HENT:      Head: Normocephalic and atraumatic.      Right Ear: External ear normal.      Left Ear: External ear normal.      Nose: Nose normal.   Eyes:      General: Lids are normal. No scleral icterus.        Right eye: No discharge.         Left eye: No discharge.      Conjunctiva/sclera: Conjunctivae normal.   Pulmonary:      Effort: Pulmonary effort is normal. No respiratory distress.   Musculoskeletal:         General: Normal range of motion.      Cervical back: Neck supple.   Skin:     General: Skin is warm and dry.      Coloration: Skin is not pale.      Findings: Erythema and rash present. Rash is urticarial. Rash is not crusting, pustular, scaling or vesicular.   Neurological:      Mental " Status: She is alert and oriented to person, place, and time. She is not disoriented.   Psychiatric:         Speech: Speech normal.         Behavior: Behavior normal.       Kenalog 40mg / ml - tolerates well    Assessment/Plan:   1. Rash and nonspecific skin eruption  - triamcinolone acetonide (KENALOG-40) injection 40 mg    -Follow up with allergist and dermatology  -Continue antihistamines  -Return to clinic with lack of resolution or progression of symptoms.      I have worn an N95 mask, gloves and eye protection for the entire encounter with this patient.     Differential diagnosis, natural history, supportive care, and indications for immediate follow-up discussed.

## 2022-03-26 ENCOUNTER — HOSPITAL ENCOUNTER (OUTPATIENT)
Dept: RADIOLOGY | Facility: MEDICAL CENTER | Age: 32
End: 2022-03-26
Attending: FAMILY MEDICINE
Payer: COMMERCIAL

## 2022-03-26 DIAGNOSIS — G89.29 CHRONIC BILATERAL LOW BACK PAIN WITHOUT SCIATICA: ICD-10-CM

## 2022-03-26 DIAGNOSIS — M54.50 CHRONIC BILATERAL LOW BACK PAIN WITHOUT SCIATICA: ICD-10-CM

## 2022-03-26 DIAGNOSIS — M79.641 PAIN IN BOTH HANDS: ICD-10-CM

## 2022-03-26 DIAGNOSIS — M79.671 PAIN IN BOTH FEET: ICD-10-CM

## 2022-03-26 DIAGNOSIS — M79.672 PAIN IN BOTH FEET: ICD-10-CM

## 2022-03-26 DIAGNOSIS — M79.642 PAIN IN BOTH HANDS: ICD-10-CM

## 2022-03-26 PROCEDURE — 72202 X-RAY EXAM SI JOINTS 3/> VWS: CPT

## 2022-03-26 PROCEDURE — 77077 JOINT SURVEY SINGLE VIEW: CPT

## 2022-03-29 DIAGNOSIS — L50.9 HIVES OF UNKNOWN ORIGIN: ICD-10-CM

## 2022-03-29 DIAGNOSIS — M79.641 PAIN IN BOTH HANDS: ICD-10-CM

## 2022-03-29 DIAGNOSIS — M79.671 PAIN IN BOTH FEET: ICD-10-CM

## 2022-03-29 DIAGNOSIS — Z99.2 CCPD (CONTINUOUS CYCLING PERITONEAL DIALYSIS) STATUS (HCC): ICD-10-CM

## 2022-03-29 DIAGNOSIS — R76.8 POSITIVE ANTI-CCP TEST: ICD-10-CM

## 2022-03-29 DIAGNOSIS — R53.82 CHRONIC FATIGUE: ICD-10-CM

## 2022-03-29 DIAGNOSIS — R21 RASH AND OTHER NONSPECIFIC SKIN ERUPTION: ICD-10-CM

## 2022-03-29 DIAGNOSIS — M79.672 PAIN IN BOTH FEET: ICD-10-CM

## 2022-03-29 DIAGNOSIS — M79.642 PAIN IN BOTH HANDS: ICD-10-CM

## 2022-03-30 RX ORDER — CYCLOBENZAPRINE HCL 5 MG
5 TABLET ORAL 3 TIMES DAILY PRN
Qty: 90 TABLET | Refills: 0 | Status: SHIPPED | OUTPATIENT
Start: 2022-03-30 | End: 2022-08-04

## 2022-04-14 ENCOUNTER — OFFICE VISIT (OUTPATIENT)
Dept: MEDICAL GROUP | Facility: PHYSICIAN GROUP | Age: 32
End: 2022-04-14
Payer: COMMERCIAL

## 2022-04-14 VITALS
SYSTOLIC BLOOD PRESSURE: 120 MMHG | HEART RATE: 84 BPM | DIASTOLIC BLOOD PRESSURE: 78 MMHG | WEIGHT: 224 LBS | BODY MASS INDEX: 36 KG/M2 | OXYGEN SATURATION: 100 % | TEMPERATURE: 98.5 F | HEIGHT: 66 IN

## 2022-04-14 DIAGNOSIS — Z80.6 FAMILY HISTORY OF LEUKEMIA: ICD-10-CM

## 2022-04-14 DIAGNOSIS — Z23 NEED FOR VACCINATION: ICD-10-CM

## 2022-04-14 DIAGNOSIS — E66.9 OBESITY (BMI 30-39.9): ICD-10-CM

## 2022-04-14 DIAGNOSIS — R73.01 ELEVATED FASTING GLUCOSE: ICD-10-CM

## 2022-04-14 DIAGNOSIS — M19.90 ARTHRITIS: ICD-10-CM

## 2022-04-14 DIAGNOSIS — L50.3 DERMATOGRAPHIA: ICD-10-CM

## 2022-04-14 DIAGNOSIS — D72.829 LEUKOCYTOSIS, UNSPECIFIED TYPE: ICD-10-CM

## 2022-04-14 PROCEDURE — 90471 IMMUNIZATION ADMIN: CPT | Performed by: FAMILY MEDICINE

## 2022-04-14 PROCEDURE — 99214 OFFICE O/P EST MOD 30 MIN: CPT | Mod: 25 | Performed by: FAMILY MEDICINE

## 2022-04-14 PROCEDURE — 90677 PCV20 VACCINE IM: CPT | Performed by: FAMILY MEDICINE

## 2022-04-14 RX ORDER — CETIRIZINE HYDROCHLORIDE 10 MG/1
10 TABLET ORAL 2 TIMES DAILY
COMMUNITY
Start: 2022-02-11 | End: 2022-12-16

## 2022-04-14 RX ORDER — CLOSTRIDIUM TETANI TOXOID ANTIGEN (FORMALDEHYDE INACTIVATED), CORYNEBACTERIUM DIPHTHERIAE TOXOID ANTIGEN (FORMALDEHYDE INACTIVATED), BORDETELLA PERTUSSIS TOXOID ANTIGEN (GLUTARALDEHYDE INACTIVATED), BORDETELLA PERTUSSIS FILAMENTOUS HEMAGGLUTININ ANTIGEN (FORMALDEHYDE INACTIVATED), BORDETELLA PERTUSSIS PERTACTIN ANTIGEN, AND BORDETELLA PERTUSSIS FIMBRIAE 2/3 ANTIGEN 5; 2; 2.5; 5; 3; 5 [LF]/.5ML; [LF]/.5ML; UG/.5ML; UG/.5ML; UG/.5ML; UG/.5ML
0.5 INJECTION, SUSPENSION INTRAMUSCULAR ONCE
Qty: 0.5 ML | Refills: 0 | Status: SHIPPED
Start: 2022-04-14 | End: 2022-04-14

## 2022-04-14 RX ORDER — TRAMADOL HYDROCHLORIDE 50 MG/1
50 TABLET ORAL EVERY 8 HOURS PRN
Qty: 21 TABLET | Refills: 0 | Status: SHIPPED | OUTPATIENT
Start: 2022-04-14 | End: 2022-04-21

## 2022-04-14 RX ORDER — FAMOTIDINE 20 MG/1
20 TABLET, FILM COATED ORAL 2 TIMES DAILY
COMMUNITY
Start: 2022-02-11 | End: 2023-02-08

## 2022-04-14 RX ORDER — TIMOLOL MALEATE 5 MG/ML
1 SOLUTION/ DROPS OPHTHALMIC
COMMUNITY
Start: 2022-01-16 | End: 2022-04-28

## 2022-04-14 RX ORDER — CELECOXIB 200 MG/1
200 CAPSULE ORAL 2 TIMES DAILY
Qty: 60 CAPSULE | Refills: 5 | Status: SHIPPED | OUTPATIENT
Start: 2022-04-14 | End: 2022-06-02

## 2022-04-14 ASSESSMENT — FIBROSIS 4 INDEX: FIB4 SCORE: 0.46

## 2022-04-14 NOTE — ASSESSMENT & PLAN NOTE
Her maternal grandfather has leukemia.   Pt's CBC is reassuring normal absolute neutrophils and lymphocytes.

## 2022-04-14 NOTE — ASSESSMENT & PLAN NOTE
Generalized arthralgia  Worse pain in feet and heels. Ok as long as she is on her feet and moving the moment she takes a rest and tries to walk again she is in pain that brings her to tears.   She also has skin rashes, urticaria, her dermatologist diagnosed her with dermato graphia.   She had a kenalog shot in urgent care which helped with just the rashes.   I will provide rx for celebrex 200bid and short course of tramadol of 21 pills till she sees the rheumatologist later this month.   Advised no alcohol with tramadol, no other sedation medication with tramadol.         Virtual Follow up RN assmt.       Treatment site: left postop parotid bed and neck   Last Treatment date: 04/17/2019     Pain: in neck, 8/10 if turning the neck a certain way. 0/10 pain if muscle isn't triggered.   Site of pain: sternocleidomastoid   Pain intervention: Gabapentin 300 mg nightly   Swallowing difficulty: with dry foods, have to be moist. Careful with small foods because it can get into the airway. Many swallow studies to keep tab.   Odynophagia: N/A -Can't open mouth all the way. 45 degrees.   Dry mouth: The same, not much improvement  Dry mouth intervention: hydrates with water  Taste change: normal  Voice change: yes, hoarseness when eating   Diet: general, more moist and stays away from smaller foods  Fatigue: tired in afternoons  Activity: exercises, PT and voice exercises   Swelling: mostly resolved, using Tactile pump once a week.   Skin: Intact, has burning sensation at times from the sun on left side.      Patient concern:  Refill on gabapentin      Recent imaging: MRI of face and neck on 04/30/2021  Recent procedure: N/A      Report off to Dr. Canales

## 2022-04-14 NOTE — PROGRESS NOTES
Subjective:   Madhavi Martinez is a 32 y.o. female here today for evaluation and management of:     Arthritis  Generalized arthralgia  Worse pain in feet and heels. Ok as long as she is on her feet and moving the moment she takes a rest and tries to walk again she is in pain that brings her to tears.   She also has skin rashes, urticaria, her dermatologist diagnosed her with dermato graphia.   She had a kenalog shot in urgent care which helped with just the rashes.   I will provide rx for celebrex 200bid and short course of tramadol of 21 pills till she sees the rheumatologist later this month.   Advised no alcohol with tramadol, no other sedation medication with tramadol.          Obesity (BMI 30-39.9)  Advised to avoid sweet drinks and food.   Encouraged to limit carbs and cheese      Family history of leukemia  Her maternal grandfather has leukemia.   Pt's CBC is reassuring normal absolute neutrophils and lymphocytes.          Current medicines (including changes today)  Current Outpatient Medications   Medication Sig Dispense Refill   • cetirizine (ZYRTEC) 10 MG Tab Take 10 mg by mouth 2 times a day.     • famotidine (PEPCID) 20 MG Tab Take 20 mg by mouth 2 times a day.     • VIENVA 0.1-20 MG-MCG per tablet Take 1 Tablet by mouth every day.     • celecoxib (CELEBREX) 200 MG Cap Take 1 Capsule by mouth 2 times a day. 60 Capsule 5   • traMADol (ULTRAM) 50 MG Tab Take 1 Tablet by mouth every 8 hours as needed for Moderate Pain for up to 7 days. 21 Tablet 0   • tetanus-dipth-acell pertussis (ADACEL) 5-2-15.5 LF-MCG/0.5 Suspension Inject 0.5 mL into the shoulder, thigh, or buttocks one time for 1 dose. 0.5 mL 0   • cyclobenzaprine (FLEXERIL) 5 mg tablet Take 1 Tablet by mouth 3 times a day as needed for Moderate Pain or Muscle Spasms. 90 Tablet 0   • hydrOXYzine HCl (ATARAX) 25 MG Tab Take 1 Tablet by mouth 3 times a day as needed for Itching. 30 Tablet 3     No current facility-administered medications for this  "visit.     She  has no past medical history on file.    ROS  No chest pain, no shortness of breath, no abdominal pain       Objective:     /78   Pulse 84   Temp 36.9 °C (98.5 °F) (Temporal)   Ht 1.676 m (5' 6\")   Wt 102 kg (224 lb)   SpO2 100%  Body mass index is 36.15 kg/m².   Physical Exam:  Constitutional: Alert, no distress.  Skin: Warm, dry, good turgor, no rashes in visible areas.  Eye: Equal, round and reactive, conjunctiva clear, lids normal.  ENMT: Lips without lesions, good dentition, oropharynx clear.  Neck: Trachea midline, no masses, no thyromegaly. No cervical or supraclavicular lymphadenopathy  Respiratory: Unlabored respiratory effort, lungs clear to auscultation, no wheezes, no ronchi.  Cardiovascular: Normal S1, S2, no murmur, no edema.  Abdomen: Soft, non-tender, no masses, no hepatosplenomegaly.  Psych: Alert and oriented x3, normal affect and mood.        Assessment and Plan:   The following treatment plan was discussed    1. Arthritis    - traMADol (ULTRAM) 50 MG Tab; Take 1 Tablet by mouth every 8 hours as needed for Moderate Pain for up to 7 days.  Dispense: 21 Tablet; Refill: 0    2. Elevated fasting glucose    - HEMOGLOBIN A1C; Future    3. Leukocytosis, unspecified type    - CBC WITH DIFFERENTIAL; Future    4. Dermatographia      5. Need for vaccination    - Pneumococcal Conjugate Vaccine 20-Valent (19 yrs+)  - tetanus-dipth-acell pertussis (ADACEL) 5-2-15.5 LF-MCG/0.5 Suspension; Inject 0.5 mL into the shoulder, thigh, or buttocks one time for 1 dose.  Dispense: 0.5 mL; Refill: 0    6. Obesity (BMI 30-39.9)        Followup: Return in about 7 weeks (around 6/2/2022) for arthritis, elevated fasting glucose, paperwork .         "

## 2022-04-21 NOTE — PROGRESS NOTES
[facility]  [address], [address], [place] [address]  Phone: [phone] / Fax: [phone]    RHEUMATOLOGY NEW PATIENT VISIT NOTE      DATE OF SERVICE: 04/22/2022    REFERRING PROVIDER:  Lex Guevara M.D.  [address] [place] [address]      SUBJECTIVE:     CHIEF COMPLAINT:   No chief complaint on file.      HISTORY OF PRESENT ILLNESS:  Madhavi Martinez is a 32 y.o. female with pertinent history notable for ***, presents for evaluation of polyarthralgia in the setting of positive ACPA with concern for rheumatoid arthritis. Reports onset of symptoms in *** with ***. Treatments have included ***.    Pertinent labs as of 1/2022: Positive ACPA 32 with negative RF, elevated CRP 2.07 with normal ESR, negative DENISSE.    REVIEW OF SYSTEMS:  Except as noted in the history above, a complete review of systems with emphasis on autoimmune inflammatory conditions was otherwise negative for any significant symptoms.      ACTIVE PROBLEM LIST:  Patient Active Problem List   Diagnosis   • Encounter to establish care   • Unspecified skin changes   • Obesity (BMI 30-39.9)   • Arthritis   • Dermatographia   • Family history of leukemia   No problem-specific Assessment & Plan notes found for this encounter.      PAST MEDICAL HISTORY:  No past medical history on file.    PAST SURGICAL HISTORY:  No past surgical history on file.    MEDICATIONS:  Current Outpatient Medications   Medication Sig Dispense Refill   • cetirizine (ZYRTEC) 10 MG Tab Take 10 mg by mouth 2 times a day.     • famotidine (PEPCID) 20 MG Tab Take 20 mg by mouth 2 times a day.     • VIENVA 0.1-20 MG-MCG per tablet Take 1 Tablet by mouth every day.     • celecoxib (CELEBREX) 200 MG Cap Take 1 Capsule by mouth 2 times a day. 60 Capsule 5   • traMADol (ULTRAM) 50 MG Tab Take 1 Tablet by mouth every 8 hours as needed for Moderate Pain for up to 7 days. 21 Tablet 0   • cyclobenzaprine (FLEXERIL) 5 mg tablet Take  1 Tablet by mouth 3 times a day as needed for Moderate Pain or Muscle Spasms. 90 Tablet 0   • hydrOXYzine HCl (ATARAX) 25 MG Tab Take 1 Tablet by mouth 3 times a day as needed for Itching. 30 Tablet 3     No current facility-administered medications for this visit.       ALLERGIES:   No Known Allergies    IMMUNIZATIONS:  Immunization History   Administered Date(s) Administered   • Moderna SARS-CoV-2 Vaccine 03/26/2021, 04/23/2021, 12/04/2021   • Pneumococcal Conjugate Vaccine (PCV20) 04/14/2022       SOCIAL HISTORY:   Social History     Tobacco Use   • Smoking status: Never Smoker   • Smokeless tobacco: Never Used   Substance Use Topics   • Alcohol use: Yes   • Drug use: Never       FAMILY HISTORY:  No family history on file.     OBJECTIVE:     Vital Signs: There were no vitals taken for this visit.There is no height or weight on file to calculate BMI.    General: Appears well and comfortable; no acute distress  Eyes: Extraocular muscles and vision intact; no conjunctival lesions  ENT: Oral mucosa pink and moist; no oral or nasal lesions  Head/Neck: No scalp lesions; neck supple; no cervical lymphadenopathy  Cardiovascular: Regular rate and rhythm; no murmurs  Respiratory: Clear to auscultation bilaterally; no wheezes, rales, or rhonchi  Gastrointestinal: Abdomen soft and non-tender; no masses or organomegaly  Integumentary: Skin soft and supple; no significant cutaneous lesions  Musculoskeletal: No significant joint tenderness, swelling, warmth, erythema, or signs of synovitis; no significant restriction in ROM  Neurologic: No focal sensory or motor deficits  Psychiatric: Mood and affect appropriate      LABORATORY RESULTS REVIEWED AND INTERPRETED BY ME:  Lab Results   Component Value Date/Time    WBC 11.3 (H) 01/22/2022 07:20 AM    RBC 4.24 01/22/2022 07:20 AM    HEMOGLOBIN 12.5 01/22/2022 07:20 AM    HEMATOCRIT 38.2 01/22/2022 07:20 AM    MCV 90.1 01/22/2022 07:20 AM    MCH 29.5 01/22/2022 07:20 AM    MCHC 32.7  (L) 01/22/2022 07:20 AM    RDW 44.0 01/22/2022 07:20 AM    PLATELETCT 311 01/22/2022 07:20 AM    MPV 10.7 01/22/2022 07:20 AM    NEUTS 6.95 01/22/2022 07:20 AM    LYMPHOCYTES 29.50 01/22/2022 07:20 AM    MONOCYTES 6.10 01/22/2022 07:20 AM    EOSINOPHILS 2.00 01/22/2022 07:20 AM    BASOPHILS 0.40 01/22/2022 07:20 AM     Lab Results   Component Value Date/Time    SODIUM 140 01/22/2022 07:20 AM    POTASSIUM 4.1 01/22/2022 07:20 AM    CHLORIDE 107 01/22/2022 07:20 AM    CO2 21 01/22/2022 07:20 AM    GLUCOSE 110 (H) 01/22/2022 07:20 AM    BUN 14 01/22/2022 07:20 AM    CREATININE 0.69 01/22/2022 07:20 AM     Lab Results   Component Value Date/Time    ASTSGOT 19 01/22/2022 07:20 AM    ALTSGPT 16 01/22/2022 07:20 AM    ALKPHOSPHAT 68 01/22/2022 07:20 AM    TBILIRUBIN 0.2 01/22/2022 07:20 AM    TOTPROTEIN 7.2 01/22/2022 07:20 AM    ALBUMIN 4.2 01/22/2022 07:20 AM    CALCIUM 9.1 01/22/2022 07:20 AM     Lab Results   Component Value Date/Time    SEDRATEWES 19 01/22/2022 07:20 AM    CREACTPROT 2.07 (H) 01/22/2022 07:20 AM     Lab Results   Component Value Date/Time    RHEUMFACTN <10 01/22/2022 07:20 AM    CCPANTIBODY 32 (H) 01/22/2022 07:20 AM     Lab Results   Component Value Date/Time    ANTINUCAB None Detected 01/22/2022 07:20 AM     Lab Results   Component Value Date/Time    COLORURINE Yellow 01/22/2022 07:21 AM    SPECGRAVITY 1.024 01/22/2022 07:21 AM    PHURINE 5.5 01/22/2022 07:21 AM    GLUCOSEUR Negative 01/22/2022 07:21 AM    KETONES Negative 01/22/2022 07:21 AM    PROTEINURIN Negative 01/22/2022 07:21 AM     Lab Results   Component Value Date/Time    TSHULTRASEN 1.750 01/22/2022 07:20 AM     Lab Results   Component Value Date/Time    25HYDROXY 20 (L) 01/22/2022 07:20 AM     Lab Results   Component Value Date/Time    CHOLSTRLTOT 186 01/22/2022 07:20 AM     (H) 01/22/2022 07:20 AM    HDL 52 01/22/2022 07:20 AM    TRIGLYCERIDE 148 01/22/2022 07:20 AM       RADIOLOGY RESULTS REVIEWED AND INTERPRETED BY  ME:    Results for orders placed during the hospital encounter of 03/26/22    DX-SACROILIAC JOINTS 3+    Impression  1.  No evidence of erosive arthropathy of the sacroiliac joints bilaterally.  2.  IUD in the pelvis.    Results for orders placed during the hospital encounter of 03/26/22    DX-JOINT SURVEY-HANDS SINGLE VIEW    Impression  No evidence of erosive arthropathy.    Results for orders placed during the hospital encounter of 03/26/22    DX-JOINT SURVEY-FEET SINGLE VIEW    Impression  1.  No evidence of erosive arthropathy.  2.  Calcific density adjacent to the distal fibula on the left is likely related to prior injury.    All relevant laboratory and imaging results reported on this note were reviewed and interpreted by me.      ASSESSMENT AND PLAN:     Madhavi Martinez is a 32 y.o. female with history as noted above whose presentation merits the following diagnostic and clinical status impressions and recommendations:    There are no diagnoses linked to this encounter.    REFERENCES:  N/A    FOLLOW-UP: No follow-ups on file.           Thank you for giving me the opportunity to participate in the care of Madhavi Martinez.    Gentry Beckman MD, MS  Rheumatologist, [facility]  , Department of Internal Medicine  [facility]

## 2022-04-22 ENCOUNTER — APPOINTMENT (OUTPATIENT)
Dept: RHEUMATOLOGY | Facility: MEDICAL CENTER | Age: 32
End: 2022-04-22
Payer: COMMERCIAL

## 2022-04-28 ENCOUNTER — OFFICE VISIT (OUTPATIENT)
Dept: RHEUMATOLOGY | Facility: MEDICAL CENTER | Age: 32
End: 2022-04-28
Payer: COMMERCIAL

## 2022-04-28 VITALS
HEART RATE: 64 BPM | HEIGHT: 65 IN | RESPIRATION RATE: 16 BRPM | OXYGEN SATURATION: 98 % | DIASTOLIC BLOOD PRESSURE: 60 MMHG | WEIGHT: 225.6 LBS | TEMPERATURE: 98.7 F | SYSTOLIC BLOOD PRESSURE: 110 MMHG | BODY MASS INDEX: 37.59 KG/M2

## 2022-04-28 DIAGNOSIS — Z79.899 HIGH RISK MEDICATION USE: ICD-10-CM

## 2022-04-28 DIAGNOSIS — M05.9 SEROPOSITIVE RHEUMATOID ARTHRITIS (HCC): Primary | ICD-10-CM

## 2022-04-28 DIAGNOSIS — L50.3 DERMATOGRAPHIC URTICARIA: ICD-10-CM

## 2022-04-28 PROCEDURE — 99204 OFFICE O/P NEW MOD 45 MIN: CPT | Performed by: STUDENT IN AN ORGANIZED HEALTH CARE EDUCATION/TRAINING PROGRAM

## 2022-04-28 RX ORDER — FOLIC ACID 1 MG/1
1 TABLET ORAL DAILY
Qty: 90 TABLET | Refills: 3 | Status: SHIPPED | OUTPATIENT
Start: 2022-04-28 | End: 2022-07-27

## 2022-04-28 RX ORDER — PREDNISONE 10 MG/1
TABLET ORAL
Qty: 35 TABLET | Refills: 0 | Status: SHIPPED | OUTPATIENT
Start: 2022-04-28 | End: 2022-05-26

## 2022-04-28 ASSESSMENT — FIBROSIS 4 INDEX: FIB4 SCORE: 0.46

## 2022-04-28 ASSESSMENT — PAIN SCALES - GENERAL: PAINLEVEL: 10=SEVERE PAIN

## 2022-04-28 NOTE — PATIENT INSTRUCTIONS
AFTER VISIT INSTRUCTIONS    Below are important information to help you navigate your healthcare needs and help us serve you safely and effectively:  • If laboratory tests and/or imaging studies were ordered, remember to go get them done.  • If new prescriptions or refills were sent to the pharmacy, remember to go pick them up.  • Take your medications exactly as prescribed unless instructed otherwise.  • Follow up with your primary care provider and/or specialists as scheduled.  • If there are significant findings from your lab tests and imaging studies, I will notify you with explanations via [employer] or phone call, otherwise you can view them on [facility] and let me know if you have any questions.  • Sign up for [facility] if you have not already done so, in order to have access to the results of your lab tests and imaging studies, and to be able to send and receive messages from me.  • Please note that [facility] messaging is for non-urgent matters that do not require immediate attention and are typically read only during office hours, so do not expect immediate responses from me.

## 2022-04-28 NOTE — PROGRESS NOTES
[facility] - ARTHRIT[facility]  [address], [address], [place] [address]  Phone: [phone] / Fax: [phone]    RHEUMATOLOGY NEW PATIENT VISIT NOTE      DATE OF SERVICE: 04/28/2022    REFERRING PROVIDER:  Lex Guevara M.D.  [address] [place] [address]      SUBJECTIVE:     CHIEF COMPLAINT:   Chief Complaint   Patient presents with   • New Patient     Joint pain with elevated CCP test        HISTORY OF PRESENT ILLNESS:  Madhavi Martinez is a 32 y.o. female with pertinent history notable for dermatographia with urticaria, presents for evaluation of polyarthralgia in the setting of positive ACPA with concern for rheumatoid arthritis. Reports onset of symptoms in 12/2021 with joint pain (up to 8-9/10 severity) in her hands (mostly PIP joints) with swelling, wrists, elbows, shoulders, lower back/hips, knees, ankles, and feet. 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 She was treated with a course of prednisone taper (helpful) and tramadol (made her very tired), but she has been managing mostly with Celebrex and applying heat compresses on the joints and body. She notes a history of photosensitive dermatographia associated with wheals/swelling on her face/lips, neck, chest, and arms (showed me multiple pictures of the skin lesions on her phone). She also reports muscle aches, weakness, chronic fatigue, insomnia, headaches, nausea, chest pain with breathing, and anxiety/depression.  Pertinent labs as of 1/2022: Positive ACPA 32 with negative RF, elevated CRP 2.07 with normal ESR, negative DENISSE, normal TSH, CMP, and unremarkable CBC.  Pertinent imaging as of 3/26/22: X-rays of hands, feet, and sacroiliac joints showed no evidence of inflammatory arthropathy.    REVIEW OF SYSTEMS:  Except as noted in the history above, a complete  review of systems with emphasis on autoimmune inflammatory conditions was otherwise negative for any significant symptoms.      ACTIVE PROBLEM LIST:  Patient Active Problem List   Diagnosis   • Encounter to establish care   • Unspecified skin changes   • Obesity (BMI 30-39.9)   • Arthritis   • Dermatographia   • Family history of leukemia   No problem-specific Assessment & Plan notes found for this encounter.      PAST MEDICAL HISTORY:  No past medical history on file.    PAST SURGICAL HISTORY:  No past surgical history on file.    MEDICATIONS:  Current Outpatient Medications   Medication Sig Dispense Refill   • methotrexate 2.5 MG Tab Take 6 Tablets by mouth every 7 days for 90 days. 77 Tablet 3   • folic acid (FOLVITE) 1 MG Tab Take 1 Tablet by mouth every day for 90 days. 90 Tablet 3   • predniSONE (DELTASONE) 10 MG Tab Take 2 Tablets by mouth every day for 7 days, THEN 1.5 Tablets every day for 7 days, THEN 1 Tablet every day for 7 days, THEN 0.5 Tablets every day for 7 days. Take with food. 35 Tablet 0   • cetirizine (ZYRTEC) 10 MG Tab Take 10 mg by mouth 2 times a day.     • famotidine (PEPCID) 20 MG Tab Take 20 mg by mouth 2 times a day.     • celecoxib (CELEBREX) 200 MG Cap Take 1 Capsule by mouth 2 times a day. 60 Capsule 5   • cyclobenzaprine (FLEXERIL) 5 mg tablet Take 1 Tablet by mouth 3 times a day as needed for Moderate Pain or Muscle Spasms. 90 Tablet 0   • hydrOXYzine HCl (ATARAX) 25 MG Tab Take 1 Tablet by mouth 3 times a day as needed for Itching. 30 Tablet 3   • VIENVA 0.1-20 MG-MCG per tablet Take 1 Tablet by mouth every day.       No current facility-administered medications for this visit.       ALLERGIES:   No Known Allergies    IMMUNIZATIONS:  Immunization History   Administered Date(s) Administered   • Moderna SARS-CoV-2 Vaccine 03/26/2021, 04/23/2021, 12/04/2021   • Pneumococcal Conjugate Vaccine (PCV20) 04/14/2022       SOCIAL HISTORY:   Social History     Tobacco Use   • Smoking status:  "Never Smoker   • Smokeless tobacco: Never Used   Substance Use Topics   • Alcohol use: Yes   • Drug use: Never       FAMILY HISTORY:  No family history on file.     OBJECTIVE:     Vital Signs: /60 (BP Location: Left arm, Patient Position: Sitting, BP Cuff Size: Large adult)   Pulse 64   Temp 37.1 °C (98.7 °F) (Temporal)   Resp 16   Ht 1.651 m (5' 5\")   Wt 102 kg (225 lb 9.6 oz)   SpO2 98% Body mass index is 37.54 kg/m².    General: Appears well and comfortable; no acute distress  Eyes: Extraocular muscles and vision intact; no conjunctival lesions  ENT: Wearing facemask but no visible lesions  Head/Neck: No scalp lesions; neck supple; no cervical lymphadenopathy  Cardiovascular: Regular rate and rhythm; no murmurs  Respiratory: Clear to auscultation bilaterally; no wheezes, rales, or rhonchi  Gastrointestinal: Abdomen soft and non-tender; no masses or organomegaly  Integumentary: Skin soft and supple; no significant cutaneous lesions  Musculoskeletal: Mild tenderness on knees medial joint line and ankles over Achilles entheses; no periarticular joint swelling, warmth, erythema, or signs of synovitis; no significant restriction in ROM  Neurologic: No focal sensory or motor deficits  Psychiatric: Mood and affect appropriate      LABORATORY RESULTS REVIEWED AND INTERPRETED BY ME:  Lab Results   Component Value Date/Time    WBC 11.3 (H) 01/22/2022 07:20 AM    RBC 4.24 01/22/2022 07:20 AM    HEMOGLOBIN 12.5 01/22/2022 07:20 AM    HEMATOCRIT 38.2 01/22/2022 07:20 AM    MCV 90.1 01/22/2022 07:20 AM    MCH 29.5 01/22/2022 07:20 AM    MCHC 32.7 (L) 01/22/2022 07:20 AM    RDW 44.0 01/22/2022 07:20 AM    PLATELETCT 311 01/22/2022 07:20 AM    MPV 10.7 01/22/2022 07:20 AM    NEUTS 6.95 01/22/2022 07:20 AM    LYMPHOCYTES 29.50 01/22/2022 07:20 AM    MONOCYTES 6.10 01/22/2022 07:20 AM    EOSINOPHILS 2.00 01/22/2022 07:20 AM    BASOPHILS 0.40 01/22/2022 07:20 AM     Lab Results   Component Value Date/Time    SODIUM 140 " 01/22/2022 07:20 AM    POTASSIUM 4.1 01/22/2022 07:20 AM    CHLORIDE 107 01/22/2022 07:20 AM    CO2 21 01/22/2022 07:20 AM    GLUCOSE 110 (H) 01/22/2022 07:20 AM    BUN 14 01/22/2022 07:20 AM    CREATININE 0.69 01/22/2022 07:20 AM     Lab Results   Component Value Date/Time    ASTSGOT 19 01/22/2022 07:20 AM    ALTSGPT 16 01/22/2022 07:20 AM    ALKPHOSPHAT 68 01/22/2022 07:20 AM    TBILIRUBIN 0.2 01/22/2022 07:20 AM    TOTPROTEIN 7.2 01/22/2022 07:20 AM    ALBUMIN 4.2 01/22/2022 07:20 AM    CALCIUM 9.1 01/22/2022 07:20 AM     Lab Results   Component Value Date/Time    SEDRATEWES 19 01/22/2022 07:20 AM    CREACTPROT 2.07 (H) 01/22/2022 07:20 AM     Lab Results   Component Value Date/Time    RHEUMFACTN <10 01/22/2022 07:20 AM    CCPANTIBODY 32 (H) 01/22/2022 07:20 AM     Lab Results   Component Value Date/Time    ANTINUCAB None Detected 01/22/2022 07:20 AM     Lab Results   Component Value Date/Time    COLORURINE Yellow 01/22/2022 07:21 AM    SPECGRAVITY 1.024 01/22/2022 07:21 AM    PHURINE 5.5 01/22/2022 07:21 AM    GLUCOSEUR Negative 01/22/2022 07:21 AM    KETONES Negative 01/22/2022 07:21 AM    PROTEINURIN Negative 01/22/2022 07:21 AM     Lab Results   Component Value Date/Time    TSHULTRASEN 1.750 01/22/2022 07:20 AM     Lab Results   Component Value Date/Time    25HYDROXY 20 (L) 01/22/2022 07:20 AM     Lab Results   Component Value Date/Time    CHOLSTRLTOT 186 01/22/2022 07:20 AM     (H) 01/22/2022 07:20 AM    HDL 52 01/22/2022 07:20 AM    TRIGLYCERIDE 148 01/22/2022 07:20 AM       RADIOLOGY RESULTS REVIEWED AND INTERPRETED BY ME:    Results for orders placed during the hospital encounter of 03/26/22    DX-SACROILIAC JOINTS 3+    Impression  1.  No evidence of erosive arthropathy of the sacroiliac joints bilaterally.  2.  IUD in the pelvis.    Results for orders placed during the hospital encounter of 03/26/22    DX-JOINT SURVEY-HANDS SINGLE VIEW    Impression  No evidence of erosive arthropathy.    Results  for orders placed during the hospital encounter of 03/26/22    DX-JOINT SURVEY-FEET SINGLE VIEW    Impression  1.  No evidence of erosive arthropathy.  2.  Calcific density adjacent to the distal fibula on the left is likely related to prior injury.    All relevant laboratory and imaging results reported on this note were reviewed and interpreted by me.      ASSESSMENT AND PLAN:     Madhavi Martinez is a 32 y.o. female with history as noted above whose presentation merits the following diagnostic and clinical status impressions and recommendations:    1. Seropositive rheumatoid arthritis (positive ACPA with negative RF)  Her clinical picture is compatible with seropositive rheumatoid arthritis based on the classification criteria (refernce below) with mostly historical evidence of inflammatory arthritis. To prevent disease progression, treatment with immunomodulatory and/or immunosuppressive DMARD therapy is necessary as noted below. Given that this medication can take up to 6 or more weeks to exert clinical effect, need to bridge with a course of steroid taper for more immediate symptom relief in the meantime.  - methotrexate 2.5 MG Tab; Take 6 Tablets by mouth every 7 days for 90 days.  Dispense: 77 Tablet; Refill: 3  - predniSONE (DELTASONE) 10 MG Tab; Take 2 Tablets by mouth every day for 7 days, THEN 1.5 Tablets every day for 7 days, THEN 1 Tablet every day for 7 days, THEN 0.5 Tablets every day for 7 days. Take with food.  Dispense: 35 Tablet; Refill: 0    2. Dermatographic urticaria  The associated wheals/swelling on her face/lips, neck, chest, and arms raises concerns for possible hereditary angioedema, so it is worth testing for.  - C1 ESTERASE INHIBI    3. High risk medication use  Counseled on the potential adverse effects of long-term methotrexate use including teratogenicity, the need to avoid regular alcohol intake, and the need for routine monitoring labs. She said she has two children, now has IUD in  place, and currently not trying to get pregnant, but understands that if/when she wants to get pregnant again, we would need to stop methotrexate.  - Need routine monitoring labs initially at 1-2 months, then every 3-4 months  - folic acid (FOLVITE) 1 MG Tab; Take 1 Tablet by mouth every day for 90 days.  Dispense: 90 Tablet; Refill: 3    REFERENCE:  ACR/EULAR 2010 Classification Criteria for Rheumatoid Arthritis  · Linda GARCIA et al. 2010 Rheumatoid arthritis classification criteria: an American College of Rheumatology/ League Against Rheumatism collaborative initiative. Arthritis Rheum. 2010 Sep;62(9):2569-81. doi: 10.1002/art.65110. PMID: 40457399.    FOLLOW-UP: Return in about 2 months (around 6/28/2022) for Short.           Thank you for giving me the opportunity to participate in the care of Madhavi Martinez.    Gentry Beckman MD, MS  Rheumatologist, [facility]  , Department of Internal Medicine  [facility]

## 2022-05-05 ENCOUNTER — HOSPITAL ENCOUNTER (OUTPATIENT)
Dept: LAB | Facility: MEDICAL CENTER | Age: 32
End: 2022-05-05
Attending: FAMILY MEDICINE
Payer: COMMERCIAL

## 2022-05-05 DIAGNOSIS — D72.829 LEUKOCYTOSIS, UNSPECIFIED TYPE: ICD-10-CM

## 2022-05-05 DIAGNOSIS — R73.01 ELEVATED FASTING GLUCOSE: ICD-10-CM

## 2022-05-05 LAB
BASOPHILS # BLD AUTO: 0 % (ref 0–1.8)
BASOPHILS # BLD: 0 K/UL (ref 0–0.12)
EOSINOPHIL # BLD AUTO: 0.12 K/UL (ref 0–0.51)
EOSINOPHIL NFR BLD: 0.9 % (ref 0–6.9)
ERYTHROCYTE [DISTWIDTH] IN BLOOD BY AUTOMATED COUNT: 47.8 FL (ref 35.9–50)
EST. AVERAGE GLUCOSE BLD GHB EST-MCNC: 100 MG/DL
HBA1C MFR BLD: 5.1 % (ref 4–5.6)
HCT VFR BLD AUTO: 39.7 % (ref 37–47)
HGB BLD-MCNC: 12.9 G/DL (ref 12–16)
LYMPHOCYTES # BLD AUTO: 6.94 K/UL (ref 1–4.8)
LYMPHOCYTES NFR BLD: 52.6 % (ref 22–41)
MANUAL DIFF BLD: NORMAL
MCH RBC QN AUTO: 29.9 PG (ref 27–33)
MCHC RBC AUTO-ENTMCNC: 32.5 G/DL (ref 33.6–35)
MCV RBC AUTO: 91.9 FL (ref 81.4–97.8)
MONOCYTES # BLD AUTO: 0.58 K/UL (ref 0–0.85)
MONOCYTES NFR BLD AUTO: 4.4 % (ref 0–13.4)
MORPHOLOGY BLD-IMP: NORMAL
MYELOCYTES NFR BLD MANUAL: 0.9 %
NEUTROPHILS # BLD AUTO: 5.44 K/UL (ref 2–7.15)
NEUTROPHILS NFR BLD: 40.3 % (ref 44–72)
NEUTS BAND NFR BLD MANUAL: 0.9 % (ref 0–10)
NRBC # BLD AUTO: 0 K/UL
NRBC BLD-RTO: 0 /100 WBC
PLATELET # BLD AUTO: 337 K/UL (ref 164–446)
PLATELET BLD QL SMEAR: NORMAL
PMV BLD AUTO: 10.7 FL (ref 9–12.9)
RBC # BLD AUTO: 4.32 M/UL (ref 4.2–5.4)
RBC BLD AUTO: PRESENT
VARIANT LYMPHS BLD QL SMEAR: NORMAL
WBC # BLD AUTO: 13.2 K/UL (ref 4.8–10.8)

## 2022-05-05 PROCEDURE — 85025 COMPLETE CBC W/AUTO DIFF WBC: CPT

## 2022-05-05 PROCEDURE — 83036 HEMOGLOBIN GLYCOSYLATED A1C: CPT

## 2022-05-05 PROCEDURE — 36415 COLL VENOUS BLD VENIPUNCTURE: CPT

## 2022-05-05 PROCEDURE — 85007 BL SMEAR W/DIFF WBC COUNT: CPT

## 2022-05-06 NOTE — RESULT ENCOUNTER NOTE
Released to chato Hendrickson,  Elevated white count on labs could be due to prendisone steroid use. But next visit in June, I'll reorder this to keep an eye on it in future. Other labs look good!  Lex Guevraa M.D.

## 2022-06-02 ENCOUNTER — OFFICE VISIT (OUTPATIENT)
Dept: MEDICAL GROUP | Facility: PHYSICIAN GROUP | Age: 32
End: 2022-06-02
Payer: COMMERCIAL

## 2022-06-02 VITALS
HEART RATE: 88 BPM | OXYGEN SATURATION: 99 % | HEIGHT: 65 IN | RESPIRATION RATE: 14 BRPM | WEIGHT: 224 LBS | DIASTOLIC BLOOD PRESSURE: 70 MMHG | TEMPERATURE: 98.9 F | SYSTOLIC BLOOD PRESSURE: 134 MMHG | BODY MASS INDEX: 37.32 KG/M2

## 2022-06-02 DIAGNOSIS — Z23 NEED FOR VACCINATION: ICD-10-CM

## 2022-06-02 DIAGNOSIS — D72.829 LEUKOCYTOSIS, UNSPECIFIED TYPE: ICD-10-CM

## 2022-06-02 DIAGNOSIS — M05.9 SEROPOSITIVE RHEUMATOID ARTHRITIS (HCC): ICD-10-CM

## 2022-06-02 DIAGNOSIS — Z12.4 CERVICAL CANCER SCREENING: ICD-10-CM

## 2022-06-02 DIAGNOSIS — R52 PAIN: ICD-10-CM

## 2022-06-02 DIAGNOSIS — Z79.899 HIGH RISK MEDICATION USE: ICD-10-CM

## 2022-06-02 PROCEDURE — 99214 OFFICE O/P EST MOD 30 MIN: CPT | Performed by: FAMILY MEDICINE

## 2022-06-02 RX ORDER — DULOXETIN HYDROCHLORIDE 60 MG/1
60 CAPSULE, DELAYED RELEASE ORAL DAILY
Qty: 90 CAPSULE | Refills: 1 | Status: SHIPPED | OUTPATIENT
Start: 2022-06-02 | End: 2022-12-16

## 2022-06-02 RX ORDER — ERGOCALCIFEROL 1.25 MG/1
50000 CAPSULE ORAL
Qty: 7 CAPSULE | Refills: 0 | Status: SHIPPED | OUTPATIENT
Start: 2022-06-02 | End: 2022-07-15

## 2022-06-02 RX ORDER — CLOSTRIDIUM TETANI TOXOID ANTIGEN (FORMALDEHYDE INACTIVATED), CORYNEBACTERIUM DIPHTHERIAE TOXOID ANTIGEN (FORMALDEHYDE INACTIVATED), BORDETELLA PERTUSSIS TOXOID ANTIGEN (GLUTARALDEHYDE INACTIVATED), BORDETELLA PERTUSSIS FILAMENTOUS HEMAGGLUTININ ANTIGEN (FORMALDEHYDE INACTIVATED), BORDETELLA PERTUSSIS PERTACTIN ANTIGEN, AND BORDETELLA PERTUSSIS FIMBRIAE 2/3 ANTIGEN 5; 2; 2.5; 5; 3; 5 [LF]/.5ML; [LF]/.5ML; UG/.5ML; UG/.5ML; UG/.5ML; UG/.5ML
0.5 INJECTION, SUSPENSION INTRAMUSCULAR ONCE
Qty: 0.5 ML | Refills: 0 | Status: SHIPPED
Start: 2022-06-02 | End: 2022-06-02

## 2022-06-02 ASSESSMENT — FIBROSIS 4 INDEX: FIB4 SCORE: 0.42

## 2022-06-02 NOTE — ASSESSMENT & PLAN NOTE
Significant pain of hands and feet daily, worse at the end of the day. She is unable to stand to make her family dinner.   Good feet insert has helped.   Sitting down gives pain relief.   Feels like her ankles will give way when standing back up.   She was advised to stop celebrex by her rheumatologist  She has been taking 800 mg of tylenol (4 of the 200mg tabs) this has not provided relief.   I will start cymbalta.   Due to young age will avoid narcotics if possible due to high risk of tolerance and addiction potential but can consider this in the future.

## 2022-06-02 NOTE — PROGRESS NOTES
Subjective:   Madhavi Martinez is a 32 y.o. female here today for evaluation and management of:     Cervical cancer screening  She just had her pap done at ob in [place]: [facility] records [facility].     Pain  Significant pain of hands and feet daily, worse at the end of the day. She is unable to stand to make her family dinner.   Good feet insert has helped.   Sitting down gives pain relief.   Feels like her ankles will give way when standing back up.   She was advised to stop celebrex by her rheumatologist  She has been taking 800 mg of tylenol (4 of the 200mg tabs) this has not provided relief.   I will start cymbalta.   Due to young age will avoid narcotics if possible due to high risk of tolerance and addiction potential but can consider this in the future.            Current medicines (including changes today)  Current Outpatient Medications   Medication Sig Dispense Refill   • vitamin D2, Ergocalciferol, (DRISDOL) 1.25 MG (44453 UT) Cap capsule Take 1 Capsule by mouth every 7 days for 7 doses. Then take over the counter 5000 units of vitamin D daily. 7 Capsule 0   • tetanus-dipth-acell pertussis (ADACEL) 5-2-15.5 LF-MCG/0.5 Suspension Inject 0.5 mL into the shoulder, thigh, or buttocks one time for 1 dose. 0.5 mL 0   • DULoxetine (CYMBALTA) 60 MG Cap DR Particles delayed-release capsule Take 1 Capsule by mouth every day. 90 Capsule 1   • methotrexate 2.5 MG Tab Take 6 Tablets by mouth every 7 days for 90 days. 77 Tablet 3   • folic acid (FOLVITE) 1 MG Tab Take 1 Tablet by mouth every day for 90 days. 90 Tablet 3   • cetirizine (ZYRTEC) 10 MG Tab Take 10 mg by mouth 2 times a day.     • famotidine (PEPCID) 20 MG Tab Take 20 mg by mouth 2 times a day.     • cyclobenzaprine (FLEXERIL) 5 mg tablet Take 1 Tablet by mouth 3 times a day as needed for Moderate Pain or Muscle Spasms. 90 Tablet 0   • hydrOXYzine HCl (ATARAX) 25 MG Tab Take 1 Tablet by mouth 3 times a day as needed for Itching. 30 Tablet 3     No  "current facility-administered medications for this visit.     She  has no past medical history on file.    ROS  No chest pain, no shortness of breath, no abdominal pain       Objective:     /70   Pulse 88   Temp 37.2 °C (98.9 °F) (Temporal)   Resp 14   Ht 1.651 m (5' 5\")   Wt 102 kg (224 lb)   SpO2 99%  Body mass index is 37.28 kg/m².   Physical Exam:  Constitutional: Alert, no distress, well-groomed.  Skin: No rashes in visible areas.  Eye: Round. Conjunctiva clear, lids normal. No icterus.   ENMT: Lips pink without lesions, good dentition, moist mucous membranes. Phonation normal.  Neck: No masses, no thyromegaly. Moves freely without pain.  Respiratory: Unlabored respiratory effort, no cough or audible wheeze  Psych: Alert and oriented x3, normal affect and mood.        Assessment and Plan:   The following treatment plan was discussed    1. Cervical cancer screening      2. Leukocytosis, unspecified type    - CBC WITH DIFFERENTIAL; Future    3. Seropositive rheumatoid arthritis (positive ACPA with negative RF)    - Comp Metabolic Panel; Future    4. High risk medication use    - Comp Metabolic Panel; Future    5. Need for vaccination    - tetanus-dipth-acell pertussis (ADACEL) 5-2-15.5 LF-MCG/0.5 Suspension; Inject 0.5 mL into the shoulder, thigh, or buttocks one time for 1 dose.  Dispense: 0.5 mL; Refill: 0    6. Pain        Followup: Return in about 3 months (around 9/2/2022) for review labs, pain control, mood, .         "

## 2022-06-30 ENCOUNTER — APPOINTMENT (OUTPATIENT)
Dept: RHEUMATOLOGY | Facility: MEDICAL CENTER | Age: 32
End: 2022-06-30
Payer: COMMERCIAL

## 2022-07-08 ENCOUNTER — APPOINTMENT (OUTPATIENT)
Dept: RHEUMATOLOGY | Facility: MEDICAL CENTER | Age: 32
End: 2022-07-08
Payer: COMMERCIAL

## 2022-07-14 ENCOUNTER — HOSPITAL ENCOUNTER (OUTPATIENT)
Dept: LAB | Facility: MEDICAL CENTER | Age: 32
End: 2022-07-14
Attending: FAMILY MEDICINE
Payer: COMMERCIAL

## 2022-07-14 DIAGNOSIS — M05.9 SEROPOSITIVE RHEUMATOID ARTHRITIS (HCC): ICD-10-CM

## 2022-07-14 DIAGNOSIS — Z79.899 HIGH RISK MEDICATION USE: ICD-10-CM

## 2022-07-14 DIAGNOSIS — D72.829 LEUKOCYTOSIS, UNSPECIFIED TYPE: ICD-10-CM

## 2022-07-14 LAB
ALBUMIN SERPL BCP-MCNC: 4.5 G/DL (ref 3.2–4.9)
ALBUMIN/GLOB SERPL: 1.8 G/DL
ALP SERPL-CCNC: 79 U/L (ref 30–99)
ALT SERPL-CCNC: 26 U/L (ref 2–50)
ANION GAP SERPL CALC-SCNC: 12 MMOL/L (ref 7–16)
AST SERPL-CCNC: 20 U/L (ref 12–45)
BASOPHILS # BLD AUTO: 0.5 % (ref 0–1.8)
BASOPHILS # BLD: 0.04 K/UL (ref 0–0.12)
BILIRUB SERPL-MCNC: 0.4 MG/DL (ref 0.1–1.5)
BUN SERPL-MCNC: 10 MG/DL (ref 8–22)
CALCIUM SERPL-MCNC: 9 MG/DL (ref 8.5–10.5)
CHLORIDE SERPL-SCNC: 110 MMOL/L (ref 96–112)
CO2 SERPL-SCNC: 22 MMOL/L (ref 20–33)
CREAT SERPL-MCNC: 0.66 MG/DL (ref 0.5–1.4)
EOSINOPHIL # BLD AUTO: 0.22 K/UL (ref 0–0.51)
EOSINOPHIL NFR BLD: 2.6 % (ref 0–6.9)
ERYTHROCYTE [DISTWIDTH] IN BLOOD BY AUTOMATED COUNT: 49.9 FL (ref 35.9–50)
GFR SERPLBLD CREATININE-BSD FMLA CKD-EPI: 121 ML/MIN/1.73 M 2
GLOBULIN SER CALC-MCNC: 2.5 G/DL (ref 1.9–3.5)
GLUCOSE SERPL-MCNC: 100 MG/DL (ref 65–99)
HCT VFR BLD AUTO: 39.3 % (ref 37–47)
HGB BLD-MCNC: 12.8 G/DL (ref 12–16)
IMM GRANULOCYTES # BLD AUTO: 0.03 K/UL (ref 0–0.11)
IMM GRANULOCYTES NFR BLD AUTO: 0.4 % (ref 0–0.9)
LYMPHOCYTES # BLD AUTO: 3 K/UL (ref 1–4.8)
LYMPHOCYTES NFR BLD: 35.1 % (ref 22–41)
MCH RBC QN AUTO: 30.3 PG (ref 27–33)
MCHC RBC AUTO-ENTMCNC: 32.6 G/DL (ref 33.6–35)
MCV RBC AUTO: 93.1 FL (ref 81.4–97.8)
MONOCYTES # BLD AUTO: 0.71 K/UL (ref 0–0.85)
MONOCYTES NFR BLD AUTO: 8.3 % (ref 0–13.4)
NEUTROPHILS # BLD AUTO: 4.55 K/UL (ref 2–7.15)
NEUTROPHILS NFR BLD: 53.1 % (ref 44–72)
NRBC # BLD AUTO: 0 K/UL
NRBC BLD-RTO: 0 /100 WBC
PLATELET # BLD AUTO: 300 K/UL (ref 164–446)
PMV BLD AUTO: 10.9 FL (ref 9–12.9)
POTASSIUM SERPL-SCNC: 4.2 MMOL/L (ref 3.6–5.5)
PROT SERPL-MCNC: 7 G/DL (ref 6–8.2)
RBC # BLD AUTO: 4.22 M/UL (ref 4.2–5.4)
SODIUM SERPL-SCNC: 144 MMOL/L (ref 135–145)
WBC # BLD AUTO: 8.6 K/UL (ref 4.8–10.8)

## 2022-07-14 PROCEDURE — 36415 COLL VENOUS BLD VENIPUNCTURE: CPT

## 2022-07-14 PROCEDURE — 80053 COMPREHEN METABOLIC PANEL: CPT

## 2022-07-14 PROCEDURE — 85025 COMPLETE CBC W/AUTO DIFF WBC: CPT

## 2022-07-20 ENCOUNTER — OFFICE VISIT (OUTPATIENT)
Dept: URGENT CARE | Facility: PHYSICIAN GROUP | Age: 32
End: 2022-07-20
Payer: COMMERCIAL

## 2022-07-20 VITALS
HEART RATE: 90 BPM | OXYGEN SATURATION: 97 % | WEIGHT: 215 LBS | TEMPERATURE: 97.6 F | SYSTOLIC BLOOD PRESSURE: 112 MMHG | RESPIRATION RATE: 16 BRPM | DIASTOLIC BLOOD PRESSURE: 76 MMHG | BODY MASS INDEX: 35.82 KG/M2 | HEIGHT: 65 IN

## 2022-07-20 DIAGNOSIS — U07.1 COVID-19: ICD-10-CM

## 2022-07-20 PROCEDURE — 99213 OFFICE O/P EST LOW 20 MIN: CPT

## 2022-07-20 ASSESSMENT — ENCOUNTER SYMPTOMS
HEMOPTYSIS: 0
SPUTUM PRODUCTION: 0
CHILLS: 0
SHORTNESS OF BREATH: 0
SORE THROAT: 1
FEVER: 0
SINUS PAIN: 0
WHEEZING: 0
COUGH: 0
WEIGHT LOSS: 0
DIAPHORESIS: 0
MYALGIAS: 0

## 2022-07-20 ASSESSMENT — FIBROSIS 4 INDEX: FIB4 SCORE: 0.39

## 2022-07-20 NOTE — PROGRESS NOTES
Subjective:   Madhavi Martinez is a 32 y.o. female who presents for Coronavirus Screening (Positive at home, requesting paxlovid )      HPI:  This is a 32-year-old female who presents today for nasal congestion, sore throat, fatigue x1 day.  Patient reports positive COVID home test last night.  She has had a recent sick contacts.  She has been taking Mucinex for symptoms which have been helpful.  Pertinent negatives include fevers, chills, body aches.  Patient reports overall mild symptoms.  She is vaccinated for COVID    Review of Systems   Constitutional: Positive for malaise/fatigue. Negative for chills, diaphoresis, fever and weight loss.   HENT: Positive for congestion and sore throat. Negative for sinus pain.    Respiratory: Negative for cough, hemoptysis, sputum production, shortness of breath and wheezing.    Musculoskeletal: Negative for myalgias.   All other systems reviewed and are negative.      Medications:    Current Outpatient Medications on File Prior to Visit   Medication Sig Dispense Refill   • DULoxetine (CYMBALTA) 60 MG Cap DR Particles delayed-release capsule Take 1 Capsule by mouth every day. 90 Capsule 1   • methotrexate 2.5 MG Tab Take 6 Tablets by mouth every 7 days for 90 days. 77 Tablet 3   • folic acid (FOLVITE) 1 MG Tab Take 1 Tablet by mouth every day for 90 days. 90 Tablet 3   • cetirizine (ZYRTEC) 10 MG Tab Take 10 mg by mouth 2 times a day.     • famotidine (PEPCID) 20 MG Tab Take 20 mg by mouth 2 times a day.     • cyclobenzaprine (FLEXERIL) 5 mg tablet Take 1 Tablet by mouth 3 times a day as needed for Moderate Pain or Muscle Spasms. 90 Tablet 0   • hydrOXYzine HCl (ATARAX) 25 MG Tab Take 1 Tablet by mouth 3 times a day as needed for Itching. 30 Tablet 3     No current facility-administered medications on file prior to visit.        Allergies:   Patient has no known allergies.    Problem List:   Patient Active Problem List   Diagnosis   • Encounter to establish care   • Unspecified  "skin changes   • Obesity (BMI 30-39.9)   • Arthritis   • Dermatographic urticaria   • Family history of leukemia   • Seropositive rheumatoid arthritis (positive ACPA with negative RF)   • High risk medication use   • Cervical cancer screening   • Pain        Surgical History:  No past surgical history on file.    Past Social Hx:   Social History     Tobacco Use   • Smoking status: Never Smoker   • Smokeless tobacco: Never Used   Substance Use Topics   • Alcohol use: Yes   • Drug use: Never          Problem list, medications, and allergies reviewed by myself today in Epic.     Objective:     /76   Pulse 90   Temp 36.4 °C (97.6 °F) (Temporal)   Resp 16   Ht 1.651 m (5' 5\")   Wt 97.5 kg (215 lb)   SpO2 97%   BMI 35.78 kg/m²     Physical Exam  Vitals and nursing note reviewed.   Constitutional:       General: She is awake. She is not in acute distress.     Appearance: Normal appearance. She is well-developed. She is not ill-appearing or toxic-appearing.   HENT:      Head: Normocephalic and atraumatic.      Nose: Nose normal.      Mouth/Throat:      Mouth: Mucous membranes are moist.      Pharynx: Oropharynx is clear. No oropharyngeal exudate or posterior oropharyngeal erythema.   Cardiovascular:      Rate and Rhythm: Normal rate and regular rhythm.      Pulses: Normal pulses.      Heart sounds: Normal heart sounds. No murmur heard.    No friction rub. No gallop.   Pulmonary:      Effort: Pulmonary effort is normal. No respiratory distress.      Breath sounds: Normal breath sounds. No stridor. No wheezing, rhonchi or rales.   Chest:      Chest wall: No tenderness.   Musculoskeletal:      Cervical back: Neck supple. No tenderness.   Lymphadenopathy:      Cervical: No cervical adenopathy.   Skin:     General: Skin is warm and dry.      Capillary Refill: Capillary refill takes less than 2 seconds.   Neurological:      General: No focal deficit present.      Mental Status: She is alert and oriented to person, " place, and time. Mental status is at baseline.   Psychiatric:         Mood and Affect: Mood normal.         Behavior: Behavior normal. Behavior is cooperative.         Thought Content: Thought content normal.         Judgment: Judgment normal.         Assessment/Plan:     Diagnosis and associated orders:   1. COVID-19        Comments/MDM:   Pt is clinically stable at today's acute urgent care visit.  No acute distress noted. Appropriate for outpatient management at this time.     Acute problem  Patient is not ill or toxic appearing today.  She reports overall mild symptoms.  Discussed potential treatment options for COVID to include paxlovid.  Discussed potential side effects of this medication, emergency use, and multiple drug interactions; patient would like to move forward with supportive measures at this time.  Advised patient to increase oral hydration, rest, isolate per CDC guidelines,Tylenol and ibuprofen as needed.  Patient is to return for any new or worsening signs or symptoms.  She has verbalized understanding           • Discussed DDx, management options (risks,benefits, and alternatives to planned treatment), natural progression and supportive care.  Expressed understanding and the treatment plan was agreed upon. Questions were encouraged and answered   • Return to urgent care prn if new or worsening sx or if there is no improvement in condition prn.    • Educated in Red flags and indications to immediately call 911 or present to the Emergency Department.   • Advised the patient to follow-up with the primary care physician for recheck, reevaluation, and consideration of further management.    I personally reviewed prior external notes and test results pertinent to today's visit.  I have independently reviewed and interpreted all diagnostics ordered during this urgent care acute visit.         Please note that this dictation was created using voice recognition software. I have made a reasonable attempt  to correct obvious errors, but I expect that there are errors of grammar and possibly content that I did not discover before finalizing the note.    This note was electronically signed by VICENTE Harkins

## 2022-07-21 ENCOUNTER — TELEPHONE (OUTPATIENT)
Dept: RHEUMATOLOGY | Facility: MEDICAL CENTER | Age: 32
End: 2022-07-21
Payer: COMMERCIAL

## 2022-07-21 NOTE — TELEPHONE ENCOUNTER
Spoke with Dr Beckman he advised pt hold today's dose and next weeks dose as well. I left a detailed message notifying patient asked to call back if she has any questions

## 2022-07-21 NOTE — TELEPHONE ENCOUNTER
Patient left voicemail she has tested positive for covid yesterday 7/20/22  She is due to take her Methotrexate today and was advised from PCP to call our office and ask if she should take today's dose or skip thru her covid course. Please advise

## 2022-08-04 ENCOUNTER — OFFICE VISIT (OUTPATIENT)
Dept: MEDICAL GROUP | Facility: PHYSICIAN GROUP | Age: 32
End: 2022-08-04
Payer: COMMERCIAL

## 2022-08-04 VITALS
TEMPERATURE: 98.2 F | HEIGHT: 66 IN | BODY MASS INDEX: 35.2 KG/M2 | DIASTOLIC BLOOD PRESSURE: 80 MMHG | SYSTOLIC BLOOD PRESSURE: 110 MMHG | WEIGHT: 219 LBS | HEART RATE: 86 BPM | RESPIRATION RATE: 16 BRPM | OXYGEN SATURATION: 98 %

## 2022-08-04 DIAGNOSIS — F39 MOOD DISORDER (HCC): ICD-10-CM

## 2022-08-04 DIAGNOSIS — R52 PAIN: ICD-10-CM

## 2022-08-04 DIAGNOSIS — R45.86 MOOD SWINGS: ICD-10-CM

## 2022-08-04 PROCEDURE — 99214 OFFICE O/P EST MOD 30 MIN: CPT | Performed by: FAMILY MEDICINE

## 2022-08-04 RX ORDER — GABAPENTIN 300 MG/1
300 CAPSULE ORAL 3 TIMES DAILY
Qty: 90 CAPSULE | Refills: 3 | Status: SHIPPED | OUTPATIENT
Start: 2022-08-04 | End: 2023-12-21

## 2022-08-04 ASSESSMENT — FIBROSIS 4 INDEX: FIB4 SCORE: 0.39

## 2022-08-04 NOTE — ASSESSMENT & PLAN NOTE
Gabapentin rx provided. If this does not help can try elavil.   Insurance did not cover cymbalta.   Patient has pain of hands and feet daily worse at the end of the day.   Shoe inserts help,   Currently taking tylenol without adequately pain relief.  Has RA and followed by rheumatology.

## 2022-08-04 NOTE — PROGRESS NOTES
"Subjective:   Madhavi Martinez is a 32 y.o. female here today for evaluation and management of:     Pain  Gabapentin rx provided. If this does not help can try elavil.   Insurance did not cover cymbalta.   Patient has pain of hands and feet daily worse at the end of the day.   Shoe inserts help,   Currently taking tylenol without adequately pain relief.  Has RA and followed by rheumatology.     Mood swings  She and her  note she has mood swings, she has days of depressed mood and crying and times of high energy and not eating at all for a day or spending too much.   I suspect she may have a mood disorder or bipolar disorder.   She has no psychosis, no hallucinations  She sometimes feels scatterbrained but not losing touch with reality.   She has occasional passive SI but no active plan.  Will refer to psychology and psychiatry for treatment and evaluation.   Her son's biological father passed away from suicide.         Current medicines (including changes today)  Current Outpatient Medications   Medication Sig Dispense Refill   • gabapentin (NEURONTIN) 300 MG Cap Take 1 Capsule by mouth 3 times a day. 90 Capsule 3   • DULoxetine (CYMBALTA) 60 MG Cap DR Particles delayed-release capsule Take 1 Capsule by mouth every day. 90 Capsule 1   • cetirizine (ZYRTEC) 10 MG Tab Take 10 mg by mouth 2 times a day.     • famotidine (PEPCID) 20 MG Tab Take 20 mg by mouth 2 times a day.     • hydrOXYzine HCl (ATARAX) 25 MG Tab Take 1 Tablet by mouth 3 times a day as needed for Itching. 30 Tablet 3     No current facility-administered medications for this visit.     She  has no past medical history on file.    ROS  No chest pain, no shortness of breath, no abdominal pain       Objective:     /80   Pulse 86   Temp 36.8 °C (98.2 °F) (Temporal)   Resp 16   Ht 1.676 m (5' 6\")   Wt 99.3 kg (219 lb)   SpO2 98%  Body mass index is 35.35 kg/m².   Physical Exam:  Constitutional: Alert, no distress, well-groomed.  Skin: No " rashes in visible areas.  Eye: Round. Conjunctiva clear, lids normal. No icterus.   ENMT: Lips pink without lesions, good dentition, moist mucous membranes. Phonation normal.  Neck: No masses, no thyromegaly. Moves freely without pain.  Respiratory: Unlabored respiratory effort, no cough or audible wheeze  Psych: Alert and oriented x3, normal affect and mood.          Assessment and Plan:   The following treatment plan was discussed    1. Pain      2. Mood swings      3. Mood disorder (HCC)    - Referral to Psychology  - Referral to Psychiatry      Followup: Return for as scheduled in Sept.

## 2022-08-04 NOTE — ASSESSMENT & PLAN NOTE
She and her  note she has mood swings, she has days of depressed mood and crying and times of high energy and not eating at all for a day or spending too much.   I suspect she may have a mood disorder or bipolar disorder.   She has no psychosis, no hallucinations  She sometimes feels scatterbrained but not losing touch with reality.   She has occasional passive SI but no active plan.  Will refer to psychology and psychiatry for treatment and evaluation.   Her son's biological father passed away from suicide.

## 2022-08-26 ENCOUNTER — OFFICE VISIT (OUTPATIENT)
Dept: RHEUMATOLOGY | Facility: MEDICAL CENTER | Age: 32
End: 2022-08-26
Attending: STUDENT IN AN ORGANIZED HEALTH CARE EDUCATION/TRAINING PROGRAM
Payer: COMMERCIAL

## 2022-08-26 VITALS
HEIGHT: 65 IN | OXYGEN SATURATION: 97 % | RESPIRATION RATE: 16 BRPM | BODY MASS INDEX: 36.65 KG/M2 | HEART RATE: 88 BPM | SYSTOLIC BLOOD PRESSURE: 100 MMHG | DIASTOLIC BLOOD PRESSURE: 60 MMHG | WEIGHT: 220 LBS

## 2022-08-26 DIAGNOSIS — G89.29 CENTRAL SENSITIZATION TO PAIN: ICD-10-CM

## 2022-08-26 DIAGNOSIS — Z79.899 HIGH RISK MEDICATION USE: ICD-10-CM

## 2022-08-26 DIAGNOSIS — L50.3 DERMATOGRAPHIC URTICARIA: ICD-10-CM

## 2022-08-26 DIAGNOSIS — M05.9 SEROPOSITIVE RHEUMATOID ARTHRITIS (HCC): ICD-10-CM

## 2022-08-26 PROBLEM — M79.18 MYOFASCIAL PAIN: Status: ACTIVE | Noted: 2022-08-26

## 2022-08-26 PROCEDURE — 99214 OFFICE O/P EST MOD 30 MIN: CPT | Performed by: STUDENT IN AN ORGANIZED HEALTH CARE EDUCATION/TRAINING PROGRAM

## 2022-08-26 PROCEDURE — 99212 OFFICE O/P EST SF 10 MIN: CPT | Performed by: STUDENT IN AN ORGANIZED HEALTH CARE EDUCATION/TRAINING PROGRAM

## 2022-08-26 RX ORDER — SULFASALAZINE 500 MG/1
500 TABLET, DELAYED RELEASE ORAL 2 TIMES DAILY WITH MEALS
Qty: 60 TABLET | Refills: 5 | Status: SHIPPED | OUTPATIENT
Start: 2022-08-26 | End: 2023-02-08

## 2022-08-26 ASSESSMENT — RHEUMATOLOGY FOLLOW-UP QUESTIONNAIRE
COUGH WITH BLOODY PHLEGM: N
SHORTNESS OF BREATH: N
RATE_1TO10: 10
BLOOD IN URINE: N
SORE THROAT: N
SNORING: N
VERTIGO: N
BODY ACHES: Y
SKIN PLAQUES: N
IRREGULAR BEATS: Y
HAIR LOSS WITH BALD SPOTS: N
DEPRESSION: Y
NECK PAIN: N
EYE PAIN: N
SKIN THICKENING: N
CHEST PAIN WITH BREATHING: N
ACHILLES TENDON PAIN: Y
NOSEBLEEDS: N
BLOODY DIARRHEA: N
FEVERS: N
TINGLING: Y
TEMPLE PAIN: Y
THYROID PAIN: N
PELVIC PAIN: N
ABNORMAL DISCHARGE: N
NASAL ULCERS: N
DIFFICULTY SPEAKING: N
NIGHT SWEATS: N
SINONASAL CONGESTION: N
SINUS PAIN: N
DRY COUGH: N
MUSCLE WEAKNESS: Y
DRY MOUTH: N
MUSCLE PAIN: Y
HEARING LOSS: N
DRY EYES: N
BLOODY CONSTIPATION: N
MARK ALL THE AREAS OF PAIN: 76552138
DIFFICULTY SWALLOWING: N
VISION LOSS: N
DIZZINESS: Y
BURNING URINATION: N
FROTHY URINE: N
HEADACHES: Y
ABDOMINAL PAIN: N
JOINT SWELLING: Y
NUMBNESS: Y
GOITER: N
MORNING STIFFNESS: 30-60 MINS
VOMITING: N
HAIR SHEDDING: N
BLEEDING GUMS: N
PALPITATIONS: Y
ORAL ULCERS: N
EAR PAIN: N
GENITAL ULCERS: N
ANXIETY: Y
EYE REDNESS: N
HEARTBURN: N
BLURRINESS: N
RINGING IN EARS: Y
SPASMS: N
JOINT PAIN: WORSE WITH ACTIVITY
CHILLS: N
CAVITIES: Y
NAUSEA: Y

## 2022-08-26 ASSESSMENT — FIBROSIS 4 INDEX: FIB4 SCORE: 0.39

## 2022-08-26 NOTE — PATIENT INSTRUCTIONS
AFTER VISIT INSTRUCTIONS    Below are important information to help you navigate your healthcare needs and help us serve you safely and effectively:  If laboratory tests and/or imaging studies were ordered, remember to go get them done.  If new prescriptions or refills were sent to the pharmacy, remember to go pick them up.  Take your medications exactly as prescribed unless instructed otherwise.  If there are significant findings on your lab tests and imaging studies that warrant further action, I will notify you with explanations via [employer] or phone call, otherwise you can view them on [employer] and let me know if you have any questions.  Sign up for [email or web address] if you have not already done so, in order to have access to the results of your lab tests and imaging studies, and to be able to send and receive messages from me.  Note that [email or web address] messages are typically read during office hours only and may take 1-7 days before a response depending on the urgency of the situation and how busy my schedule is.  In general, [email or web address] messaging is for non-urgent matters that do not require immediate attention, so for urgent matters that cannot wait, you are advised to go to an urgent care.

## 2022-08-26 NOTE — PROGRESS NOTES
[facility] ? [facility]  [address], [address], [place] [address]  Phone: [phone] ? Fax: [phone]    RHEUMATOLOGY FOLLOW-UP VISIT NOTE      DATE OF SERVICE: 08/26/2022    PRIMARY CARE PROVIDER:  Lex Guevara M.D.  [address] [place] [address]    MEDICAL RECORD NUMBER:  [id number]    LAST CLINIC VISIT:  Visit date not found      SUBJECTIVE:     CHIEF COMPLAINT:   Chief Complaint   Patient presents with    Follow-Up     Seropositive rheumatoid arthritis       RHEUMATOLOGIC HISTORY:  Madhavi Martinez is a 32 y.o. female with pertinent history notable for seropositive rheumatoid arthritis diagnosed in 4/2022 (positive ACPA with negative RF), and dermatographia with urticaria among other comorbidities. She initially presented on 4/28/2022 for evaluation of polyarthralgia in the setting of positive ACPA with concern for rheumatoid arthritis. Reported onset of symptoms in 12/2021 with joint pain (up to 8-9/10 severity) in her hands (mostly PIP joints) with swelling, wrists, elbows, shoulders, lower back/hips, knees, ankles, and feet. 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 She was treated with a course of prednisone taper (helpful) and tramadol (made her very tired), but she had been managing mostly with Celebrex and applying heat compresses on the joints and body. 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 She reported multiple associated symptoms including muscle aches, weakness, chronic fatigue, insomnia, headaches, nausea, chest pain with breathing, and anxiety/depression.    Pertinent treatment history as of 8/2022: Methotrexate 15 mg oral weekly (4/2022-8/2022, causes  stomach upset and not very helpful), prednisone 20 mg taper (4/2022-5/2022, helpful but caused GI and mood issues).  Pertinent labs as of 1-7/2022: Positive ACPA 32 with negative RF, elevated CRP 2.07 with normal ESR; negative/normal DENISSE, TSH, unremarkable CBC and CMP.  Pertinent imaging as of 3/2022: X-rays of hands, feet, and sacroiliac joints showed no evidence of inflammatory arthropathy.    INTERVAL HISTORY:  [facility] Established Patient History Form    8/26/2022 10:20 AM PDT - Filed by Patient   Chief Complaint Arthritis pain   Interval History of Present Condition   Date of worsening symptom onset:    Preceding incident/ailment: Held methotrexate during recovery from COVID-19 infection in 7/2022   Describe/list your symptoms: Severe pain in heels/feet, knee, lower back, shoulder and neck   Aggravating factors: I work 12 hour shifts   Alleviating factors: Soaks and rest   Helpful medications: None found yet but gabapentin somewhat helpful   Ineffective medications: Methotrexate (causes stomach upset)   Symptom severity/impact (scale of 1-10): 10   Personal/emotional stressors:    [place] The Locations Of Pain    REVIEW OF SYSTEMS    General   Fevers No   Chills No   Night sweats No   Unintentional Weight Loss None   Musculoskeletal   Joint pain Worse with activity   Morning stiffness 30-60 mins   Joint swelling Yes   Achilles tendon pain Yes   Muscle pain Yes   Body Aches Yes   Dermatologic   Hair loss with bald spots No   Hair shedding No   Sunlight-induced skin rash    Skin thickening No   Skin plaques No   Cold-induced color changes (white, purple, red on rewarming)    Neurologic/Psychiatric   Muscle weakness Yes   Spasms No   Tingling Yes   Numbness Yes   Anxiety Yes   Depression Yes   Head/Eyes   Headaches Yes   Temple pain Yes   Dizziness Yes   Dry eyes No   Eye pain No   Eye redness No   Blurriness No   Vision loss No   Ears/Nose   Ear pain No   Ringing in ears Yes   Vertigo No   Hearing loss  No   Nasal ulcers No   Nosebleeds No   Sinus pain No   Sinonasal congestion No   Snoring No   Mouth/Throat   Oral ulcers No   Bleeding gums No   Dry mouth No   Cavities Yes   Sore throat No   Difficulty speaking No   Difficulty swallowing No   Neck/Lymphatics   Neck pain No   Thyroid pain No   Goiter No   Swollen Glands    Cardiac/Respiratory   Chest pain with breathing No   Dry cough No   Cough with bloody phlegm No   Shortness of breath No   Palpitations Yes   Irregular beats Yes   Gastrointestinal   Nausea Yes   Vomiting No   Heartburn No   Abdominal pain No   Bloody diarrhea No   Bloody constipation No   Genitourinary   Pelvic Pain No   Genital ulcers No   Abnormal discharge No   Burning urination No   Frothy urine No   Blood in urine No   Supplemental Information   Notable Life Changes/Adjustments Since Last Visit        ACTIVE PROBLEM LIST:  Patient Active Problem List   Diagnosis    Encounter to establish care    Unspecified skin changes    Obesity (BMI 30-39.9)    Arthritis    Dermatographic urticaria    Family history of leukemia    Seropositive rheumatoid arthritis (positive ACPA with negative RF)    High risk medication use    Cervical cancer screening    Pain    Mood swings    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      PAST MEDICAL HISTORY:  History reviewed. No pertinent past medical history.    PAST SURGICAL HISTORY:  History reviewed. No pertinent surgical history.    MEDICATIONS:  Current Outpatient Medications   Medication Sig Dispense Refill    methotrexate 2.5 MG Tab Take 2.5 mg by mouth every 7 days.      sulfaSALAzine (AZULFIDINE EN-TAB) 500 MG EC tablet Take 1 Tablet by mouth 2 times a day with meals. 60 Tablet 5    gabapentin (NEURONTIN) 300 MG Cap Take 1 Capsule by mouth 3 times a day. 90 Capsule 3    cetirizine (ZYRTEC) 10 MG Tab Take 10 mg by mouth 2 times a day.      hydrOXYzine HCl (ATARAX) 25 MG Tab Take 1 Tablet by mouth 3  "times a day as needed for Itching. 30 Tablet 3    DULoxetine (CYMBALTA) 60 MG Cap DR Particles delayed-release capsule Take 1 Capsule by mouth every day. (Patient not taking: Reported on 8/26/2022) 90 Capsule 1    famotidine (PEPCID) 20 MG Tab Take 20 mg by mouth 2 times a day. (Patient not taking: Reported on 8/26/2022)       No current facility-administered medications for this visit.       ALLERGIES:   No Known Allergies    IMMUNIZATIONS:  Immunization History   Administered Date(s) Administered    MODERNA SARS-COV-2 VACCINE (12+) 03/26/2021, 04/23/2021, 12/04/2021    Pneumococcal Conjugate Vaccine (PCV20) 04/14/2022       SOCIAL HISTORY:   Social History     Tobacco Use    Smoking status: Never    Smokeless tobacco: Never   Vaping Use    Vaping Use: Never used   Substance Use Topics    Alcohol use: Yes    Drug use: Yes     Types: Marijuana, Inhaled, Oral       FAMILY HISTORY:  History reviewed. No pertinent family history.     OBJECTIVE:     Vital Signs: /60 (BP Location: Right arm, Patient Position: Sitting, BP Cuff Size: Large adult)   Pulse 88   Resp 16   Ht 1.651 m (5' 5\")   Wt 99.8 kg (220 lb)   SpO2 97% Body mass index is 36.61 kg/m².    General: Appears well and comfortable  Eyes: No scleral or conjunctival lesions  ENT: No apparent oral or nasal lesions  Head/Neck: No apparent scalp or neck lesions  Cardiovascular: Regular rate and rhythm  Respiratory: Breathing quiet and unlabored  Gastrointestinal: No organomegaly or abdominal masses  Integumentary: No significant cutaneous lesions or discolorations  Musculoskeletal: Mild tenderness of hands (on MCP and PIP squeeze), wrists, shoulders, knees, ankles, and several large muscle groups of the upper/lower extremities and upper/lower back; no significant restriction in range of motion of joints examined  Neurologic: No focal sensory or motor deficits  Psychiatric: Mood and affect appropriate      LABORATORY RESULTS REVIEWED AND INTERPRETED BY " ME:  Lab Results   Component Value Date/Time    SEDRATEWES 19 01/22/2022 07:20 AM    CREACTPROT 2.07 (H) 01/22/2022 07:20 AM     Lab Results   Component Value Date/Time    RHEUMFACTN <10 01/22/2022 07:20 AM    CCPANTIBODY 32 (H) 01/22/2022 07:20 AM     Lab Results   Component Value Date/Time    ANTINUCAB None Detected 01/22/2022 07:20 AM     Lab Results   Component Value Date/Time    TSHULTRASEN 1.750 01/22/2022 07:20 AM     Lab Results   Component Value Date/Time    ASTSGOT 20 07/14/2022 07:13 AM    ALTSGPT 26 07/14/2022 07:13 AM    ALKPHOSPHAT 79 07/14/2022 07:13 AM    TBILIRUBIN 0.4 07/14/2022 07:13 AM    TOTPROTEIN 7.0 07/14/2022 07:13 AM    ALBUMIN 4.5 07/14/2022 07:13 AM     Lab Results   Component Value Date/Time    SODIUM 144 07/14/2022 07:13 AM    POTASSIUM 4.2 07/14/2022 07:13 AM    CHLORIDE 110 07/14/2022 07:13 AM    CO2 22 07/14/2022 07:13 AM    GLUCOSE 100 (H) 07/14/2022 07:13 AM    BUN 10 07/14/2022 07:13 AM    CREATININE 0.66 07/14/2022 07:13 AM    CALCIUM 9.0 07/14/2022 07:13 AM     Lab Results   Component Value Date/Time    WBC 8.6 07/14/2022 07:13 AM    RBC 4.22 07/14/2022 07:13 AM    HEMOGLOBIN 12.8 07/14/2022 07:13 AM    HEMATOCRIT 39.3 07/14/2022 07:13 AM    MCV 93.1 07/14/2022 07:13 AM    MCH 30.3 07/14/2022 07:13 AM    MCHC 32.6 (L) 07/14/2022 07:13 AM    RDW 49.9 07/14/2022 07:13 AM    PLATELETCT 300 07/14/2022 07:13 AM    MPV 10.9 07/14/2022 07:13 AM    NEUTS 4.55 07/14/2022 07:13 AM    LYMPHOCYTES 35.10 07/14/2022 07:13 AM    MONOCYTES 8.30 07/14/2022 07:13 AM    EOSINOPHILS 2.60 07/14/2022 07:13 AM    BASOPHILS 0.50 07/14/2022 07:13 AM     Lab Results   Component Value Date/Time    25HYDROXY 20 (L) 01/22/2022 07:20 AM     Lab Results   Component Value Date/Time    COLORURINE Yellow 01/22/2022 07:21 AM    SPECGRAVITY 1.024 01/22/2022 07:21 AM    PHURINE 5.5 01/22/2022 07:21 AM    GLUCOSEUR Negative 01/22/2022 07:21 AM    KETONES Negative 01/22/2022 07:21 AM    PROTEINURIN Negative  01/22/2022 07:21 AM     Lab Results   Component Value Date/Time    CHOLRLTOT 186 01/22/2022 07:20 AM     (H) 01/22/2022 07:20 AM    HDL 52 01/22/2022 07:20 AM    TRIGLYCERIDE 148 01/22/2022 07:20 AM    HBA1C 5.1 05/05/2022 07:40 AM       RADIOLOGY RESULTS REVIEWED AND INTERPRETED BY ME:  Results for orders placed during the hospital encounter of 03/26/22    DX-JOINT SURVEY-FEET SINGLE VIEW    Impression  1.  No evidence of erosive arthropathy.  2.  Calcific density adjacent to the distal fibula on the left is likely related to prior injury.    Results for orders placed during the hospital encounter of 03/26/22    DX-JOINT SURVEY-HANDS SINGLE VIEW    Impression  No evidence of erosive arthropathy.    Results for orders placed during the hospital encounter of 03/26/22    DX-SACROILIAC JOINTS 3+    Impression  1.  No evidence of erosive arthropathy of the sacroiliac joints bilaterally.  2.  IUD in the pelvis.      All relevant laboratory and imaging results reported on this note were reviewed and interpreted by me.      ASSESSMENT AND PLAN:     Madhavi Martinez is a 32 y.o. female with history as noted above whose presentation merits the following diagnostic and clinical status impressions and recommendations:    1. Seropositive rheumatoid arthritis (positive ACPA with negative RF)  Clinical picture suggests active or inadequately controlled disease on the current regimen of methotrexate which also causes stomach upset, so needs optimization of treatment by switching to sulfasalazine. Given that DMARDs can take up to 6 or more weeks to exert clinical effect, could bridge with a course of steroid taper but she is not inclined to doing so and would rather manage with NSAIDs and Tylenol which seem to be helpful. In the meantime, need to check her inflammatory markers and evaluate for risk of spondylarthritis especially considering her lower back pain/stiffness.  - Sed Rate; Future  - CRP QUANTITIVE (NON-CARDIAC);  Future  - HLA-B27; Future  - sulfaSALAzine (AZULFIDINE EN-TAB) 500 MG EC tablet; Take 1 Tablet by mouth 2 times a day with meals.  Dispense: 60 Tablet; Refill: 5  - Okay to take NSAIDs and Tylenol as needed for now    2. Dermatographic urticaria  Previously reported associated wheals/swelling on her face/lips, neck, chest, and arms raised concern for possible underlying hereditary angioedema despite low index of suspicion, so worth ruling out.  - C1 ESTERASE INHIBI (previously ordered)    3. 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 The etiology is thought to be due to perturbations in the neuroendocrine or pain pathways of the central nervous system typically in individuals with predisposing conditions rooted in the central nervous system. This most commonly occurs in individuals with anxiety and depression, but can also be triggered by a major emotional and/or physical trauma, serious ailment requiring significant medical and/or surgical intervention, and perpetuated by obstructive sleep apnea, especially in the setting of obesity. This condition is often a diagnosis of exclusion, but may occur in the setting of an underlying chronic systemic disease, particularly a hormonal imbalance, which may alter the neuroendocrine pain pathways. In general, management of this condition typically entails engagement in low intensity low impact aerobic exercise and treatment with a neuromodulator.  - Recommend polysomnography/sleep study for possible EDWINA in case CPAP is needed  - Continue neuromodulatory therapy with gabapentin as prescribed/managed by the PCP    4. High risk medication use  No current history, physical findings, or laboratory evidence to suggest significant adverse drug effects or opportunistic infections.  - Need routine monitoring labs every 3-4 months  - Need  to confirm/address age-appropriate vaccines      FOLLOW-UP: Return in about 3 months (around 11/26/2022) for Short.           Thank you for giving me the opportunity to participate in the care of Madhavi Martinez.    Gentry Beckman MD, MS  Rheumatologist, [facility] ? [facility]  , Department of Internal Medicine  [facility] ? [facility]

## 2022-08-30 ENCOUNTER — HOSPITAL ENCOUNTER (OUTPATIENT)
Dept: LAB | Facility: MEDICAL CENTER | Age: 32
End: 2022-08-30
Attending: STUDENT IN AN ORGANIZED HEALTH CARE EDUCATION/TRAINING PROGRAM
Payer: COMMERCIAL

## 2022-08-30 DIAGNOSIS — M05.9 SEROPOSITIVE RHEUMATOID ARTHRITIS (HCC): ICD-10-CM

## 2022-08-30 LAB
CRP SERPL HS-MCNC: 1.31 MG/DL (ref 0–0.75)
ERYTHROCYTE [SEDIMENTATION RATE] IN BLOOD BY WESTERGREN METHOD: 19 MM/HOUR (ref 0–25)

## 2022-08-30 PROCEDURE — 85652 RBC SED RATE AUTOMATED: CPT

## 2022-08-30 PROCEDURE — 86812 HLA TYPING A B OR C: CPT

## 2022-08-30 PROCEDURE — 86140 C-REACTIVE PROTEIN: CPT

## 2022-08-30 PROCEDURE — 36415 COLL VENOUS BLD VENIPUNCTURE: CPT

## 2022-08-31 LAB — HLA-B27 QL FC: NEGATIVE

## 2022-09-16 ENCOUNTER — TELEPHONE (OUTPATIENT)
Dept: URGENT CARE | Facility: PHYSICIAN GROUP | Age: 32
End: 2022-09-16
Payer: COMMERCIAL

## 2022-09-22 DIAGNOSIS — R21 RASH AND OTHER NONSPECIFIC SKIN ERUPTION: ICD-10-CM

## 2022-09-22 RX ORDER — HYDROXYZINE HYDROCHLORIDE 25 MG/1
25 TABLET, FILM COATED ORAL 3 TIMES DAILY PRN
Qty: 30 TABLET | Refills: 3 | Status: SHIPPED | OUTPATIENT
Start: 2022-09-22 | End: 2023-04-27 | Stop reason: SDUPTHER

## 2022-09-22 NOTE — TELEPHONE ENCOUNTER
LAST OV 8/4/22    Received request via: Pharmacy    Was the patient seen in the last year in this department? Yes    Does the patient have an active prescription (recently filled or refills available) for medication(s) requested? No

## 2022-09-29 ENCOUNTER — OFFICE VISIT (OUTPATIENT)
Dept: URGENT CARE | Facility: PHYSICIAN GROUP | Age: 32
End: 2022-09-29
Payer: COMMERCIAL

## 2022-09-29 VITALS
DIASTOLIC BLOOD PRESSURE: 61 MMHG | HEIGHT: 65 IN | SYSTOLIC BLOOD PRESSURE: 114 MMHG | HEART RATE: 60 BPM | RESPIRATION RATE: 16 BRPM | OXYGEN SATURATION: 99 % | BODY MASS INDEX: 35.82 KG/M2 | WEIGHT: 215 LBS | TEMPERATURE: 97.6 F

## 2022-09-29 DIAGNOSIS — J02.0 STREP PHARYNGITIS: ICD-10-CM

## 2022-09-29 DIAGNOSIS — J02.9 SORE THROAT: ICD-10-CM

## 2022-09-29 LAB
INT CON NEG: NORMAL
INT CON POS: NORMAL
S PYO AG THROAT QL: POSITIVE

## 2022-09-29 PROCEDURE — 99213 OFFICE O/P EST LOW 20 MIN: CPT | Performed by: PHYSICIAN ASSISTANT

## 2022-09-29 PROCEDURE — 87880 STREP A ASSAY W/OPTIC: CPT | Performed by: PHYSICIAN ASSISTANT

## 2022-09-29 RX ORDER — AMOXICILLIN 500 MG/1
500 CAPSULE ORAL 2 TIMES DAILY
Qty: 20 CAPSULE | Refills: 0 | Status: SHIPPED | OUTPATIENT
Start: 2022-09-29 | End: 2022-10-09

## 2022-09-29 RX ORDER — FOLIC ACID 1 MG/1
1 TABLET ORAL
COMMUNITY
Start: 2022-09-22

## 2022-09-29 ASSESSMENT — ENCOUNTER SYMPTOMS
NAUSEA: 0
HEADACHES: 0
EYE REDNESS: 0
SWOLLEN GLANDS: 1
EYE DISCHARGE: 0
COUGH: 0
MYALGIAS: 0
TROUBLE SWALLOWING: 0
SORE THROAT: 1
SHORTNESS OF BREATH: 0
VOMITING: 0
FEVER: 0

## 2022-09-29 ASSESSMENT — FIBROSIS 4 INDEX: FIB4 SCORE: 0.39

## 2022-09-29 NOTE — PROGRESS NOTES
Subjective     Madhavi Martinez is a 32 y.o. female who presents with Pharyngitis (X 2 days. White patches at the back of throat)            Pharyngitis   This is a new problem. Episode onset: x 3-4 days ago. The problem has been unchanged. Neither side of throat is experiencing more pain than the other. There has been no fever. The pain is mild. Associated symptoms include swollen glands. Pertinent negatives include no congestion, coughing, drooling, ear pain, headaches, shortness of breath, trouble swallowing or vomiting. She has had no exposure to strep. Treatments tried: The patient has been drinking tea and honey.     The patient reports no recent sick contacts.  No known exposure to COVID-19.  The patient is fully vaccinated against COVID-19.    PMH:  has no past medical history on file.  MEDS:   Current Outpatient Medications:     folic acid (FOLVITE) 1 MG Tab, Take 1 mg by mouth every day., Disp: , Rfl:     hydrOXYzine HCl (ATARAX) 25 MG Tab, Take 1 Tablet by mouth 3 times a day as needed for Itching., Disp: 30 Tablet, Rfl: 3    methotrexate 2.5 MG Tab, Take 2.5 mg by mouth every 7 days., Disp: , Rfl:     sulfaSALAzine (AZULFIDINE EN-TAB) 500 MG EC tablet, Take 1 Tablet by mouth 2 times a day with meals., Disp: 60 Tablet, Rfl: 5    gabapentin (NEURONTIN) 300 MG Cap, Take 1 Capsule by mouth 3 times a day., Disp: 90 Capsule, Rfl: 3    cetirizine (ZYRTEC) 10 MG Tab, Take 10 mg by mouth 2 times a day., Disp: , Rfl:     DULoxetine (CYMBALTA) 60 MG Cap DR Particles delayed-release capsule, Take 1 Capsule by mouth every day. (Patient not taking: No sig reported), Disp: 90 Capsule, Rfl: 1    famotidine (PEPCID) 20 MG Tab, Take 20 mg by mouth 2 times a day. (Patient not taking: No sig reported), Disp: , Rfl:   ALLERGIES: No Known Allergies  SURGHX: History reviewed. No pertinent surgical history.  SOCHX:  reports that she has never smoked. She has never used smokeless tobacco. She reports current alcohol use. She  "reports current drug use. Drugs: Marijuana, Inhaled, and Oral.  FH: Family history was reviewed, no pertinent findings to report      Review of Systems   Constitutional:  Negative for fever.   HENT:  Positive for sore throat. Negative for congestion, drooling, ear pain and trouble swallowing.    Eyes:  Negative for discharge and redness.   Respiratory:  Negative for cough and shortness of breath.    Cardiovascular:  Negative for chest pain.   Gastrointestinal:  Negative for nausea and vomiting.   Musculoskeletal:  Negative for myalgias.   Neurological:  Negative for headaches.            Objective     /61   Pulse 60   Temp 36.4 °C (97.6 °F) (Temporal)   Resp 16   Ht 1.651 m (5' 5\")   Wt 97.5 kg (215 lb)   SpO2 99%   BMI 35.78 kg/m²      Physical Exam  Constitutional:       General: She is not in acute distress.     Appearance: Normal appearance. She is not ill-appearing.   HENT:      Head: Normocephalic and atraumatic.      Right Ear: Tympanic membrane, ear canal and external ear normal.      Left Ear: Tympanic membrane, ear canal and external ear normal.      Nose: Nose normal.      Mouth/Throat:      Mouth: Mucous membranes are moist.      Pharynx: Oropharynx is clear. Uvula midline. Posterior oropharyngeal erythema present.      Tonsils: No tonsillar exudate.   Eyes:      Extraocular Movements: Extraocular movements intact.      Conjunctiva/sclera: Conjunctivae normal.   Cardiovascular:      Rate and Rhythm: Normal rate and regular rhythm.      Heart sounds: Normal heart sounds.   Pulmonary:      Effort: Pulmonary effort is normal. No respiratory distress.      Breath sounds: Normal breath sounds. No wheezing.   Musculoskeletal:         General: Normal range of motion.      Cervical back: Normal range of motion and neck supple.   Skin:     General: Skin is warm and dry.   Neurological:      Mental Status: She is alert and oriented to person, place, and time.             Progress:  POCT Rapid Strep: " POSITIVE             Assessment & Plan      1. Sore throat  - POCT Rapid Strep A    2. Strep pharyngitis  - amoxicillin (AMOXIL) 500 MG Cap; Take 1 Capsule by mouth 2 times a day for 10 days.  Dispense: 20 Capsule; Refill: 0    Differential diagnoses, supportive care, and indications for immediate follow-up discussed with patient.   Instructed to return to clinic or nearest emergency department for any change in condition, further concerns, or worsening of symptoms.    OTC Tylenol or Motrin for fever/discomfort.  OTC Supportive Care for Sore Throat - warm salt water gargles, sore throat lozenges, warm lemon water, and/or tea.  Drink plenty of fluids  Follow-up with PCP   Return to clinic or go to the ED if symptoms worsen or fail to improve, or if patient should develop worsening/increasing sore throat, difficulty swallowing, drooling, change in voice, swollen glands, shortness of breath, ear pain, cough, congestion, fever/chills, and/or any concerning symptoms.    Discussed plan with the patient, and she agrees to the above.     I personally reviewed prior external notes and test results pertinent to today's visit.  I have independently reviewed and interpreted all diagnostics ordered during this urgent care visit.     Please note that this dictation was created using voice recognition software. I have made every reasonable attempt to correct obvious errors, but I expect that there may be errors of grammar and possibly content that I did not discover before finalizing the note.     This note was electronically signed by Jovanna Sauceda PA-C

## 2022-12-15 ASSESSMENT — RHEUMATOLOGY FOLLOW-UP QUESTIONNAIRE
HAIR LOSS WITH BALD SPOTS: N
NIGHT SWEATS: N
DIZZINESS: Y
HAIR SHEDDING: Y
SORE THROAT: N
ACHILLES TENDON PAIN: N
ANXIETY: Y
NOSEBLEEDS: N
COLD-INDUCED COLOR CHANGES (WHITE, PURPLE, RED ON REWARMING): TOES
EYE PAIN: N
DEPRESSION: Y
TEMPLE PAIN: N
BODY ACHES: Y
EYE REDNESS: N
JOINT PAIN: WORSE WITH ACTIVITY
RINGING IN EARS: N
EAR PAIN: N
SINONASAL CONGESTION: N
TINGLING: Y
NASAL ULCERS: N
CHILLS: N
DIFFICULTY SPEAKING: N
ORAL ULCERS: N
DRY MOUTH: N
MUSCLE PAIN: Y
MUSCLE WEAKNESS: Y
VERTIGO: N
SNORING: N
BLEEDING GUMS: N
DRY EYES: Y
JOINT SWELLING: Y
MARK ALL THE AREAS OF PAIN: 80284125
VOMITING: Y
SKIN THICKENING: N
SKIN PLAQUES: N
FEVERS: N
BLURRINESS: N
COLD-INDUCED COLOR CHANGES (WHITE, PURPLE, RED ON REWARMING): FINGERS
VISION LOSS: N
HEARING LOSS: N
SINUS PAIN: N
NAUSEA: Y
NUMBNESS: Y
CAVITIES: N
MORNING STIFFNESS: 30-60 MINS
DIFFICULTY SWALLOWING: N
HEADACHES: Y
SPASMS: Y

## 2022-12-16 ENCOUNTER — TELEMEDICINE (OUTPATIENT)
Dept: RHEUMATOLOGY | Facility: MEDICAL CENTER | Age: 32
End: 2022-12-16
Attending: STUDENT IN AN ORGANIZED HEALTH CARE EDUCATION/TRAINING PROGRAM
Payer: COMMERCIAL

## 2022-12-16 VITALS — HEIGHT: 66 IN | BODY MASS INDEX: 32.95 KG/M2 | WEIGHT: 205 LBS

## 2022-12-16 DIAGNOSIS — Z79.60 LONG-TERM USE OF IMMUNOSUPPRESSANT MEDICATION: ICD-10-CM

## 2022-12-16 DIAGNOSIS — M05.9 SEROPOSITIVE RHEUMATOID ARTHRITIS (HCC): ICD-10-CM

## 2022-12-16 PROCEDURE — 99214 OFFICE O/P EST MOD 30 MIN: CPT | Mod: 95 | Performed by: STUDENT IN AN ORGANIZED HEALTH CARE EDUCATION/TRAINING PROGRAM

## 2022-12-16 PROCEDURE — 99211 OFF/OP EST MAY X REQ PHY/QHP: CPT | Mod: 95 | Performed by: STUDENT IN AN ORGANIZED HEALTH CARE EDUCATION/TRAINING PROGRAM

## 2022-12-16 RX ORDER — LEFLUNOMIDE 20 MG/1
TABLET ORAL
Qty: 45 TABLET | Refills: 0 | Status: SHIPPED | OUTPATIENT
Start: 2022-12-16 | End: 2023-02-17 | Stop reason: SDUPTHER

## 2022-12-16 ASSESSMENT — FIBROSIS 4 INDEX: FIB4 SCORE: 0.39

## 2022-12-16 NOTE — PATIENT INSTRUCTIONS
AFTER VISIT INSTRUCTIONS    Below are important information to help you navigate your healthcare needs and help us serve you safely and effectively:  If laboratory tests and/or imaging studies were ordered, remember to go get them done as instructed.  If new prescriptions and/or refills were sent, remember to go pick them up from your local pharmacy, or call the specialty pharmacy to request shipment.  Always take your prescription medications exactly as prescribed unless instructed otherwise.  Note that antirheumatic drugs and steroids are immunosuppressive which means they increase your risk of infections and have multiple potential adverse effects on various organ systems in your body, though most of them are uncommon.  It is important that you are up-to-date on age-appropriate immunizations, particularly shingles and bacterial/viral pneumonia vaccines, which you can request from me or your primary care provider.  Be sure to read the drug package inserts to learn about the potential side effects of your medications before you start taking them.  If you experience any significant drug side effects, stop taking the medication and notify me promptly, and depending on the severity of the side effects, consider going to an urgent care or emergency department for immediate attention.  If there are significant findings on your lab tests and imaging studies that warrant further action, I will notify you with explanations via [employer] or phone call, otherwise you can view them on [employer] and let me know if you have any questions.  Note that [employer] messages are typically read during office hours and may take 1-7 business days before a response depending on the urgency of the situation and how busy my clinic schedule is.  In general, [employer] messaging is for non-urgent matters that do not require immediate attention, so for urgent matters that cannot wait, you are advised to go to an urgent care.  Lastly, you are granted  MyChart access to my documentation of your visit and are encouraged to read my note which details my assessment and plan for your condition.

## 2022-12-16 NOTE — PROGRESS NOTES
[facility]  [address], [address], [place] [address]  Phone: [phone] ? Fax: [phone]    RHEUMATOLOGY VIRTUAL FOLLOW-UP VISIT NOTE      This evaluation was conducted via Zoom using secure and encrypted videoconferencing technology for virtual visit. The patient was in their home in the state [facility]. The patient's identity was confirmed and verbal consent was obtained for this encounter.      DATE OF SERVICE: 12/16/2022         Subjective     PRIMARY CARE PRACTITIONER:  Lex Guevara M.D.  [address] [place] [address]    PATIENT IDENTIFICATION:  Madhavi [address] [place] [address]    YOB: 1990    MEDICAL RECORD NUMBER: [id number]         CHIEF COMPLAINT:   Chief Complaint   Patient presents with    Follow-Up     Rheumatoid arthritis        RHEUMATOLOGIC HISTORY:  Madhavi Martinez is a 32 y.o. female with pertinent history notable for seropositive rheumatoid arthritis diagnosed in 4/2022 (positive anti-CCP with negative RF), and dermatographia with urticaria among other comorbidities. She initially presented on 4/28/2022 for evaluation of polyarthralgia in the setting of positive anti-CCP. Reported onset of symptoms in 12/2021 with joint pain (up to 8-9/10 severity) in her hands (mostly PIP joints) with swelling, wrists, elbows, shoulders, lower back/hips, knees, ankles, and feet. 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 She was treated with a course of prednisone taper (helpful) and tramadol (made her very tired), but she had been managing mostly with Celebrex and applying heat compresses on the joints and body. 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  Reported multiple associated symptoms including muscle aches, weakness, chronic fatigue, insomnia, headaches, nausea, chest pain with breathing, and anxiety/depression.     Pertinent treatment history as of 8/2022: Prednisone 20 mg taper (4/2022-5/2022, helpful but caused GI and mood issues), methotrexate 15 mg oral weekly (4/2022-8/2022, not very helpful and caused stomach upset), sulfasalazine 500 mg twice daily (8/2022-present).    Pertinent labs as of 8/2022: Positive anti-CCP 32 with negative RF (in 1/2022); elevated CRP 1.31 < 2.07 with normal ESR; negative/normal DENISSE, TSH, HLA-B27, unremarkable CBC and CMP.    Pertinent XR imaging as of 3/2022: Hands, feet, and sacroiliac joints with no evidence of inflammatory arthropathy.    INTERVAL HISTORY:  [facility] [place] [place] [facility] Established Patient History Form    12/15/2022  9:02 PM PST - Filed by Patient   Chief Complaint RA pain   Interval History of Present Condition   Date of worsening symptom onset:    Preceding incident/ailment:    Describe/list your symptoms:    Aggravating factors:    Alleviating factors:    Helpful medications:    Ineffective medications:    Symptom severity/impact (scale of 1-10):    Personal/emotional stressors:    [place] The Locations Of Pain    REVIEW OF SYSTEMS    General   Fevers No   Chills No   Night sweats No   Unintentional Weight Loss    Musculoskeletal   Joint pain Worse with activity   Morning stiffness 30-60 mins   Joint swelling Yes   Achilles tendon pain No   Muscle pain Yes   Body Aches Yes   Dermatologic   Hair loss with bald spots No   Hair shedding Yes   Sunlight-induced skin rash    Skin thickening No   Skin plaques No   Cold-induced color changes (white, purple, red on rewarming) Fingers;  Toes   Neurologic/Psychiatric   Muscle weakness Yes   Spasms Yes   Tingling Yes   Numbness Yes   Anxiety Yes   Depression Yes   Head/Eyes   Headaches Yes   Temple pain No   Dizziness Yes   Dry eyes Yes   Eye pain No   Eye redness No    Blurriness No   Vision loss No   Ears/Nose   Ear pain No   Ringing in ears No   Vertigo No   Hearing loss No   Nasal ulcers No   Nosebleeds No   Sinus pain No   Sinonasal congestion No   Snoring No   Mouth/Throat   Oral ulcers No   Bleeding gums No   Dry mouth No   Cavities No   Sore throat No   Difficulty speaking No   Difficulty swallowing No   Neck/Lymphatics   Neck pain    Thyroid pain    Goiter    Swollen Glands    Cardiac/Respiratory   Chest pain with breathing    Dry cough    Cough with bloody phlegm    Shortness of breath    Palpitations    Irregular beats    Gastrointestinal   Nausea Yes   Vomiting Yes   Heartburn    Abdominal pain    Bloody diarrhea    Bloody constipation    Genitourinary   Pelvic Pain    Genital ulcers    Abnormal discharge    Burning urination    Frothy urine    Blood in urine    Supplemental Information   Notable Life Changes/Adjustments Since Last Visit        ACTIVE PROBLEM LIST:  Patient Active Problem List    Diagnosis Date Noted    Central sensitization to pain 08/26/2022    Mood swings 08/04/2022    Cervical cancer screening 06/02/2022    Pain 06/02/2022    Seropositive rheumatoid arthritis (HCC) 04/28/2022    Long-term use of immunosuppressant medication 04/28/2022    Arthritis 04/14/2022    Dermatographic urticaria 04/14/2022    Family history of leukemia 04/14/2022    Encounter to establish care 01/13/2022    Unspecified skin changes 01/13/2022    Obesity (BMI 30-39.9) 01/13/2022       PAST MEDICAL HISTORY:  History reviewed. No pertinent past medical history.    PAST SURGICAL HISTORY:  History reviewed. No pertinent surgical history.      MEDICATIONS:  Current Outpatient Medications   Medication Sig    leflunomide (ARAVA) 20 MG Tab Take 100 mg daily for 3 days, then 20 mg daily thereafter.    folic acid (FOLVITE) 1 MG Tab Take 1 mg by mouth every day.    hydrOXYzine HCl (ATARAX) 25 MG Tab Take 1 Tablet by mouth 3 times a day as needed for Itching.    sulfaSALAzine  "(AZULFIDINE EN-TAB) 500 MG EC tablet Take 1 Tablet by mouth 2 times a day with meals.    gabapentin (NEURONTIN) 300 MG Cap Take 1 Capsule by mouth 3 times a day.    famotidine (PEPCID) 20 MG Tab Take 20 mg by mouth 2 times a day.       ALLERGIES:   No Known Allergies    IMMUNIZATIONS:  Immunization History   Administered Date(s) Administered    Pneumococcal Conjugate Vaccine (PCV20) 04/14/2022       SOCIAL HISTORY:   Social History     Socioeconomic History    Marital status:    Tobacco Use    Smoking status: Never    Smokeless tobacco: Never   Vaping Use    Vaping Use: Never used   Substance and Sexual Activity    Alcohol use: Yes    Drug use: Yes     Types: Marijuana, Inhaled, Oral       FAMILY HISTORY:  History reviewed. No pertinent family history.         Objective     Vital Signs: Ht 1.676 m (5' 6\")   Wt 93 kg (205 lb) Body mass index is 33.09 kg/m².    General: Appears well and comfortable  Eyes: Not applicable  ENT: Not applicable  Head/Neck: Not applicable  Cardiovascular: Not applicable  Respiratory: Breathing quiet and unlabored  Gastrointestinal: Not applicable  Integumentary: Not applicable  Musculoskeletal: Not applicable  Neurologic: Not applicable  Psychiatric: Mood and affect appropriate      PAST LABORATORY RESULTS REVIEWED AND INTERPRETED BY ME:  Lab Results   Component Value Date/Time    SEDRATEWES 19 08/30/2022 09:50 AM    CREACTPROT 1.31 (H) 08/30/2022 09:50 AM     Lab Results   Component Value Date/Time    RHEUMFACTN <10 01/22/2022 07:20 AM    CCPANTIBODY 32 (H) 01/22/2022 07:20 AM    PDZH90FKML Negative 08/30/2022 09:50 AM     Lab Results   Component Value Date/Time    ANTINUCAB None Detected 01/22/2022 07:20 AM     Lab Results   Component Value Date/Time    TSHULTRASEN 1.750 01/22/2022 07:20 AM     Lab Results   Component Value Date/Time    ASTSGOT 20 07/14/2022 07:13 AM    ALTSGPT 26 07/14/2022 07:13 AM    ALKPHOSPHAT 79 07/14/2022 07:13 AM    TBILIRUBIN 0.4 07/14/2022 07:13 AM    " TOTPROTEIN 7.0 07/14/2022 07:13 AM    ALBUMIN 4.5 07/14/2022 07:13 AM     Lab Results   Component Value Date/Time    SODIUM 144 07/14/2022 07:13 AM    POTASSIUM 4.2 07/14/2022 07:13 AM    CHLORIDE 110 07/14/2022 07:13 AM    CO2 22 07/14/2022 07:13 AM    GLUCOSE 100 (H) 07/14/2022 07:13 AM    BUN 10 07/14/2022 07:13 AM    CREATININE 0.66 07/14/2022 07:13 AM    CALCIUM 9.0 07/14/2022 07:13 AM     Lab Results   Component Value Date/Time    WBC 8.6 07/14/2022 07:13 AM    RBC 4.22 07/14/2022 07:13 AM    HEMOGLOBIN 12.8 07/14/2022 07:13 AM    HEMATOCRIT 39.3 07/14/2022 07:13 AM    MCV 93.1 07/14/2022 07:13 AM    MCH 30.3 07/14/2022 07:13 AM    MCHC 32.6 (L) 07/14/2022 07:13 AM    RDW 49.9 07/14/2022 07:13 AM    PLATELETCT 300 07/14/2022 07:13 AM    MPV 10.9 07/14/2022 07:13 AM    NEUTS 4.55 07/14/2022 07:13 AM    LYMPHOCYTES 35.10 07/14/2022 07:13 AM    MONOCYTES 8.30 07/14/2022 07:13 AM    EOSINOPHILS 2.60 07/14/2022 07:13 AM    BASOPHILS 0.50 07/14/2022 07:13 AM     Lab Results   Component Value Date/Time    25HYDROXY 20 (L) 01/22/2022 07:20 AM     Lab Results   Component Value Date/Time    COLORURINE Yellow 01/22/2022 07:21 AM    SPECGRAVITY 1.024 01/22/2022 07:21 AM    PHURINE 5.5 01/22/2022 07:21 AM    GLUCOSEUR Negative 01/22/2022 07:21 AM    KETONES Negative 01/22/2022 07:21 AM    PROTEINURIN Negative 01/22/2022 07:21 AM     Lab Results   Component Value Date/Time    CHOLSTRLTOT 186 01/22/2022 07:20 AM     (H) 01/22/2022 07:20 AM    HDL 52 01/22/2022 07:20 AM    TRIGLYCERIDE 148 01/22/2022 07:20 AM    HBA1C 5.1 05/05/2022 07:40 AM       PAST RADIOLOGY RESULTS REVIEWED AND INTERPRETED BY ME:  Results for orders placed during the hospital encounter of 03/26/22    DX-JOINT SURVEY-FEET SINGLE VIEW    Impression  1.  No evidence of erosive arthropathy.  2.  Calcific density adjacent to the distal fibula on the left is likely related to prior injury.    Results for orders placed during the hospital encounter of  03/26/22    DX-JOINT SURVEY-HANDS SINGLE VIEW    Impression  No evidence of erosive arthropathy.    Results for orders placed during the hospital encounter of 03/26/22    DX-SACROILIAC JOINTS 3+    Impression  1.  No evidence of erosive arthropathy of the sacroiliac joints bilaterally.  2.  IUD in the pelvis.      All relevant laboratory and imaging results reported on this note were reviewed and interpreted by me.         Assessment & Plan     Madhavi Martinez is a 32 y.o. female with history as noted above whose presentation merits the following diagnostic and clinical status impressions and recommendations:    1. Seropositive rheumatoid arthritis (HCC)  Clinically active disease with inadequate control on the current regimen of sulfasalazine monotherapy, so needs escalation of treatment to leflunomide starting with a loading dose. In the meantime, reasonable to check serologic markers of disease activity for complete assessment.  - Sed Rate; Future  - CRP QUANTITIVE (NON-CARDIAC); Future  - leflunomide (ARAVA) 20 MG Tab; Take 100 mg daily for 3 days, then 20 mg daily thereafter.  Dispense: 45 Tablet; Refill: 0  - Stop sulfasalazine 500 mg BID  - Analgesics and NSAIDs as needed    2. Long-term use of immunosuppressant medication  No present history or laboratory evidence to suggest significant adverse drug effects or opportunistic infections.  - CBC WITH DIFFERENTIAL; Future  - Comp Metabolic Panel; Future  - Need to ascertain age-appropriate vaccines      FOLLOW-UP: No follow-ups on file.         Thank you for the opportunity to participate in the care of Madhavi Martinez.    Gentry Beckman MD, MS  Rheumatologist, [facility] ? [facility]   of Clinical Medicine, Department of Internal Medicine  Count includes the [facility] ? [facility]

## 2023-02-02 ENCOUNTER — OFFICE VISIT (OUTPATIENT)
Dept: MEDICAL GROUP | Facility: PHYSICIAN GROUP | Age: 33
End: 2023-02-02
Payer: COMMERCIAL

## 2023-02-02 ENCOUNTER — HOSPITAL ENCOUNTER (OUTPATIENT)
Dept: LAB | Facility: MEDICAL CENTER | Age: 33
End: 2023-02-02
Attending: STUDENT IN AN ORGANIZED HEALTH CARE EDUCATION/TRAINING PROGRAM
Payer: COMMERCIAL

## 2023-02-02 VITALS
OXYGEN SATURATION: 98 % | RESPIRATION RATE: 14 BRPM | BODY MASS INDEX: 33.99 KG/M2 | DIASTOLIC BLOOD PRESSURE: 80 MMHG | HEART RATE: 60 BPM | WEIGHT: 204 LBS | TEMPERATURE: 97.4 F | HEIGHT: 65 IN | SYSTOLIC BLOOD PRESSURE: 120 MMHG

## 2023-02-02 DIAGNOSIS — Z79.60 LONG-TERM USE OF IMMUNOSUPPRESSANT MEDICATION: ICD-10-CM

## 2023-02-02 DIAGNOSIS — Z11.59 ENCOUNTER FOR HEPATITIS C SCREENING TEST FOR LOW RISK PATIENT: ICD-10-CM

## 2023-02-02 DIAGNOSIS — M05.9 SEROPOSITIVE RHEUMATOID ARTHRITIS (HCC): ICD-10-CM

## 2023-02-02 DIAGNOSIS — F41.9 ANXIETY: ICD-10-CM

## 2023-02-02 LAB
ALBUMIN SERPL BCP-MCNC: 4.7 G/DL (ref 3.2–4.9)
ALBUMIN/GLOB SERPL: 1.7 G/DL
ALP SERPL-CCNC: 84 U/L (ref 30–99)
ALT SERPL-CCNC: 23 U/L (ref 2–50)
ANION GAP SERPL CALC-SCNC: 12 MMOL/L (ref 7–16)
AST SERPL-CCNC: 21 U/L (ref 12–45)
BASOPHILS # BLD AUTO: 0.9 % (ref 0–1.8)
BASOPHILS # BLD: 0.07 K/UL (ref 0–0.12)
BILIRUB SERPL-MCNC: 0.4 MG/DL (ref 0.1–1.5)
BUN SERPL-MCNC: 9 MG/DL (ref 8–22)
CALCIUM ALBUM COR SERPL-MCNC: 8.4 MG/DL (ref 8.5–10.5)
CALCIUM SERPL-MCNC: 9 MG/DL (ref 8.5–10.5)
CHLORIDE SERPL-SCNC: 108 MMOL/L (ref 96–112)
CO2 SERPL-SCNC: 22 MMOL/L (ref 20–33)
CREAT SERPL-MCNC: 0.65 MG/DL (ref 0.5–1.4)
CRP SERPL HS-MCNC: 1.03 MG/DL (ref 0–0.75)
EOSINOPHIL # BLD AUTO: 0.27 K/UL (ref 0–0.51)
EOSINOPHIL NFR BLD: 3.5 % (ref 0–6.9)
ERYTHROCYTE [DISTWIDTH] IN BLOOD BY AUTOMATED COUNT: 43.8 FL (ref 35.9–50)
ERYTHROCYTE [SEDIMENTATION RATE] IN BLOOD BY WESTERGREN METHOD: 11 MM/HOUR (ref 0–25)
GFR SERPLBLD CREATININE-BSD FMLA CKD-EPI: 121 ML/MIN/1.73 M 2
GLOBULIN SER CALC-MCNC: 2.8 G/DL (ref 1.9–3.5)
GLUCOSE SERPL-MCNC: 98 MG/DL (ref 65–99)
HCT VFR BLD AUTO: 39.4 % (ref 37–47)
HGB BLD-MCNC: 12.9 G/DL (ref 12–16)
IMM GRANULOCYTES # BLD AUTO: 0.01 K/UL (ref 0–0.11)
IMM GRANULOCYTES NFR BLD AUTO: 0.1 % (ref 0–0.9)
LYMPHOCYTES # BLD AUTO: 2.45 K/UL (ref 1–4.8)
LYMPHOCYTES NFR BLD: 31.7 % (ref 22–41)
MCH RBC QN AUTO: 29.2 PG (ref 27–33)
MCHC RBC AUTO-ENTMCNC: 32.7 G/DL (ref 33.6–35)
MCV RBC AUTO: 89.1 FL (ref 81.4–97.8)
MONOCYTES # BLD AUTO: 0.7 K/UL (ref 0–0.85)
MONOCYTES NFR BLD AUTO: 9 % (ref 0–13.4)
NEUTROPHILS # BLD AUTO: 4.24 K/UL (ref 2–7.15)
NEUTROPHILS NFR BLD: 54.8 % (ref 44–72)
NRBC # BLD AUTO: 0 K/UL
NRBC BLD-RTO: 0 /100 WBC
PLATELET # BLD AUTO: 284 K/UL (ref 164–446)
PMV BLD AUTO: 10.6 FL (ref 9–12.9)
POTASSIUM SERPL-SCNC: 3.9 MMOL/L (ref 3.6–5.5)
PROT SERPL-MCNC: 7.5 G/DL (ref 6–8.2)
RBC # BLD AUTO: 4.42 M/UL (ref 4.2–5.4)
SODIUM SERPL-SCNC: 142 MMOL/L (ref 135–145)
WBC # BLD AUTO: 7.7 K/UL (ref 4.8–10.8)

## 2023-02-02 PROCEDURE — 85025 COMPLETE CBC W/AUTO DIFF WBC: CPT

## 2023-02-02 PROCEDURE — 99214 OFFICE O/P EST MOD 30 MIN: CPT | Performed by: FAMILY MEDICINE

## 2023-02-02 PROCEDURE — 80053 COMPREHEN METABOLIC PANEL: CPT

## 2023-02-02 PROCEDURE — 36415 COLL VENOUS BLD VENIPUNCTURE: CPT

## 2023-02-02 PROCEDURE — 86140 C-REACTIVE PROTEIN: CPT

## 2023-02-02 PROCEDURE — 85652 RBC SED RATE AUTOMATED: CPT

## 2023-02-02 ASSESSMENT — PATIENT HEALTH QUESTIONNAIRE - PHQ9: CLINICAL INTERPRETATION OF PHQ2 SCORE: 0

## 2023-02-02 ASSESSMENT — FIBROSIS 4 INDEX: FIB4 SCORE: 0.41

## 2023-02-02 NOTE — ASSESSMENT & PLAN NOTE
Worse anxiety over last year, she is having panic attacks, mind goes to worse case scenarios.   She has ref to psych and psychology, had not established yet as she was not sure if insurances would change.   Also with significant suicidal ideation due to side effect from sulfasalazine

## 2023-02-02 NOTE — ASSESSMENT & PLAN NOTE
Adverse side effect and insufficient response with sulfasalazine: increased suicidal ideation.   Significant improved symptoms of body pain, joint pains, generalized inflammation all improved on leflunomide. She has been feeling she can go back to the gym, is slowly getting improvement in weight, has seen some weight loss. BMI 33.9  She will contact her specialist for refills.     FMLA completed for her for work accommodations.

## 2023-02-02 NOTE — PROGRESS NOTES
Subjective:   Madhavi Martinez is a 33 y.o. female here today for evaluation and management of:     Anxiety  Worse anxiety over last year, she is having panic attacks, mind goes to worse case scenarios.   She has ref to psych and psychology, had not established yet as she was not sure if insurances would change.   Also with significant suicidal ideation due to side effect from sulfasalazine     Seropositive rheumatoid arthritis (HCC)  Adverse side effect and insufficient response with sulfasalazine: increased suicidal ideation.   Significant improved symptoms of body pain, joint pains, generalized inflammation all improved on leflunomide. She has been feeling she can go back to the gym, is slowly getting improvement in weight, has seen some weight loss. BMI 33.9  She will contact her specialist for refills.     FMLA completed for her for work accommodations.                  Current medicines (including changes today)  Current Outpatient Medications   Medication Sig Dispense Refill    leflunomide (ARAVA) 20 MG Tab Take 100 mg daily for 3 days, then 20 mg daily thereafter. 45 Tablet 0    folic acid (FOLVITE) 1 MG Tab Take 1 mg by mouth every day.      hydrOXYzine HCl (ATARAX) 25 MG Tab Take 1 Tablet by mouth 3 times a day as needed for Itching. 30 Tablet 3    gabapentin (NEURONTIN) 300 MG Cap Take 1 Capsule by mouth 3 times a day. 90 Capsule 3    famotidine (PEPCID) 20 MG Tab Take 20 mg by mouth 2 times a day.      sulfaSALAzine (AZULFIDINE EN-TAB) 500 MG EC tablet Take 1 Tablet by mouth 2 times a day with meals. (Patient not taking: Reported on 2/2/2023) 60 Tablet 5     No current facility-administered medications for this visit.     She  has no past medical history on file.    ROS  No chest pain, no shortness of breath, no abdominal pain       Objective:     /80 (BP Location: Left arm, Patient Position: Sitting, BP Cuff Size: Adult long)   Pulse 60   Temp 36.3 °C (97.4 °F) (Temporal)   Resp 14   Ht 1.651 m  "(5' 5\")   Wt 92.5 kg (204 lb)   SpO2 98%  Body mass index is 33.95 kg/m².   Physical Exam:  Constitutional: Alert, no distress.  Skin: Warm, dry, good turgor, no rashes in visible areas.  Eye: Equal, round and reactive, conjunctiva clear, lids normal.  ENMT: Lips without lesions, good dentition, oropharynx clear.  Neck: Trachea midline, no masses, no thyromegaly. No cervical or supraclavicular lymphadenopathy  Respiratory: Unlabored respiratory effort, lungs clear to auscultation, no wheezes, no ronchi.  Cardiovascular: Normal S1, S2, no murmur, no edema.  Abdomen: Soft, non-tender, no masses, no hepatosplenomegaly.  Psych: Alert and oriented x3, normal affect and mood.        Assessment and Plan:   The following treatment plan was discussed    1. Anxiety    2. Seropositive rheumatoid arthritis (HCC)    3. Encounter for hepatitis C screening test for low risk patient  - HCV Scrn ( [id number] 1xLife); Future      Followup: Return in about 3 months (around 2023) for RA.           "

## 2023-02-05 ENCOUNTER — OFFICE VISIT (OUTPATIENT)
Dept: URGENT CARE | Facility: PHYSICIAN GROUP | Age: 33
End: 2023-02-05
Payer: COMMERCIAL

## 2023-02-05 VITALS
SYSTOLIC BLOOD PRESSURE: 118 MMHG | BODY MASS INDEX: 32.78 KG/M2 | WEIGHT: 204 LBS | TEMPERATURE: 98.5 F | OXYGEN SATURATION: 100 % | HEIGHT: 66 IN | HEART RATE: 97 BPM | RESPIRATION RATE: 18 BRPM | DIASTOLIC BLOOD PRESSURE: 70 MMHG

## 2023-02-05 DIAGNOSIS — B96.89 ACUTE BACTERIAL SINUSITIS: ICD-10-CM

## 2023-02-05 DIAGNOSIS — J01.90 ACUTE BACTERIAL SINUSITIS: ICD-10-CM

## 2023-02-05 DIAGNOSIS — J03.90 EXUDATIVE TONSILLITIS: ICD-10-CM

## 2023-02-05 DIAGNOSIS — J02.9 PHARYNGITIS, UNSPECIFIED ETIOLOGY: ICD-10-CM

## 2023-02-05 LAB
INT CON NEG: NORMAL
INT CON POS: NORMAL
S PYO AG THROAT QL: NEGATIVE

## 2023-02-05 PROCEDURE — 99213 OFFICE O/P EST LOW 20 MIN: CPT | Performed by: NURSE PRACTITIONER

## 2023-02-05 PROCEDURE — 87880 STREP A ASSAY W/OPTIC: CPT | Performed by: NURSE PRACTITIONER

## 2023-02-05 RX ORDER — CEFDINIR 300 MG/1
300 CAPSULE ORAL 2 TIMES DAILY
Qty: 20 CAPSULE | Refills: 0 | Status: SHIPPED | OUTPATIENT
Start: 2023-02-05 | End: 2023-02-15

## 2023-02-05 ASSESSMENT — ENCOUNTER SYMPTOMS
CHILLS: 1
HEADACHES: 1
TROUBLE SWALLOWING: 1
VOMITING: 0
SORE THROAT: 1
FEVER: 1
SPUTUM PRODUCTION: 0
SHORTNESS OF BREATH: 0
SWOLLEN GLANDS: 1
NAUSEA: 0
MYALGIAS: 0
HOARSE VOICE: 1
WHEEZING: 0
ABDOMINAL PAIN: 0
COUGH: 1
DIARRHEA: 0

## 2023-02-05 ASSESSMENT — FIBROSIS 4 INDEX: FIB4 SCORE: 0.48

## 2023-02-05 NOTE — PROGRESS NOTES
Subjective:     Madhavi Martinez is a 33 y.o. female who presents for Pharyngitis (X3 days sore throat, painful to swallow, lump on Rt neck, chest tightness. X 2 days Lft face spasm, headache)      Pharyngitis   This is a new problem. The current episode started in the past 7 days (Sore throat X 2 days). The problem has been gradually worsening. The pain is worse on the right side. Maximum temperature: Hot flashes and chills. The pain is severe. Associated symptoms include congestion, coughing, headaches, a hoarse voice, swollen glands and trouble swallowing. Pertinent negatives include no abdominal pain, diarrhea, ear pain, shortness of breath or vomiting. She has tried acetaminophen (Tea) for the symptoms.   Right sided eye spasms.     Review of Systems   Constitutional:  Positive for chills, fever and malaise/fatigue.   HENT:  Positive for congestion, hoarse voice, sore throat and trouble swallowing. Negative for ear pain.    Respiratory:  Positive for cough. Negative for sputum production, shortness of breath and wheezing.    Cardiovascular:  Positive for chest pain (right side only intermittent/tightness).   Gastrointestinal:  Negative for abdominal pain, diarrhea, nausea and vomiting.   Musculoskeletal:  Negative for myalgias.   Neurological:  Positive for headaches.     PMH: History reviewed. No pertinent past medical history.  ALLERGIES: No Known Allergies  SURGHX: History reviewed. No pertinent surgical history.  SOCHX:   Social History     Socioeconomic History    Marital status:    Tobacco Use    Smoking status: Never    Smokeless tobacco: Never   Vaping Use    Vaping Use: Never used   Substance and Sexual Activity    Alcohol use: Yes    Drug use: Yes     Types: Marijuana, Oral     FH: History reviewed. No pertinent family history.      Objective:   /70 (BP Location: Left arm, Patient Position: Sitting, BP Cuff Size: Adult)   Pulse 97   Temp 36.9 °C (98.5 °F) (Temporal)   Resp 18   Ht  "1.676 m (5' 6\")   Wt 92.5 kg (204 lb)   SpO2 100%   BMI 32.93 kg/m²     Physical Exam  Vitals and nursing note reviewed.   Constitutional:       General: She is not in acute distress.     Appearance: Normal appearance. She is normal weight. She is ill-appearing.   HENT:      Head: Normocephalic and atraumatic.      Right Ear: Tympanic membrane, ear canal and external ear normal. There is no impacted cerumen.      Left Ear: Tympanic membrane, ear canal and external ear normal. There is no impacted cerumen.      Nose: Congestion and rhinorrhea present.      Right Turbinates: Enlarged and swollen.      Left Turbinates: Enlarged and swollen.      Right Sinus: No maxillary sinus tenderness or frontal sinus tenderness.      Left Sinus: Maxillary sinus tenderness and frontal sinus tenderness present.      Mouth/Throat:      Mouth: Mucous membranes are moist.      Pharynx: Uvula midline. Pharyngeal swelling, oropharyngeal exudate and posterior oropharyngeal erythema present. No uvula swelling.      Tonsils: Tonsillar exudate present. No tonsillar abscesses. 3+ on the right. 1+ on the left.   Eyes:      Extraocular Movements: Extraocular movements intact.      Pupils: Pupils are equal, round, and reactive to light.   Cardiovascular:      Rate and Rhythm: Normal rate and regular rhythm.      Pulses: Normal pulses.      Heart sounds: Normal heart sounds.   Pulmonary:      Effort: Pulmonary effort is normal. No respiratory distress.      Breath sounds: Normal breath sounds. No stridor. No wheezing, rhonchi or rales.   Chest:      Chest wall: No tenderness.   Abdominal:      General: Abdomen is flat. Bowel sounds are normal.      Palpations: Abdomen is soft.      Tenderness: There is no abdominal tenderness. There is no right CVA tenderness or left CVA tenderness.   Musculoskeletal:         General: Normal range of motion.      Cervical back: Normal range of motion and neck supple. Tenderness present.   Lymphadenopathy:      " Cervical: Cervical adenopathy present.   Skin:     General: Skin is warm and dry.      Capillary Refill: Capillary refill takes less than 2 seconds.   Neurological:      General: No focal deficit present.      Mental Status: She is alert and oriented to person, place, and time. Mental status is at baseline.   Psychiatric:         Mood and Affect: Mood normal.         Behavior: Behavior normal.         Thought Content: Thought content normal.         Judgment: Judgment normal.       Assessment/Plan:   Assessment    1. Pharyngitis, unspecified etiology  POCT Rapid Strep A      2. Exudative tonsillitis  cefdinir (OMNICEF) 300 MG Cap      3. Acute bacterial sinusitis  cefdinir (OMNICEF) 300 MG Cap        We discussed supportive measures including humidifier, warm salt water gargles, over-the-counter Cepacol throat lozenges, rest  and increased fluids. Pt was encouraged to seek treatment back in the ER or urgent care for worsening symptoms,  fever greater than 100.5, wheezes or shortness of breath.

## 2023-02-15 ENCOUNTER — HOSPITAL ENCOUNTER (EMERGENCY)
Facility: MEDICAL CENTER | Age: 33
End: 2023-02-15
Attending: EMERGENCY MEDICINE
Payer: COMMERCIAL

## 2023-02-15 ENCOUNTER — APPOINTMENT (OUTPATIENT)
Dept: RADIOLOGY | Facility: MEDICAL CENTER | Age: 33
End: 2023-02-15
Attending: EMERGENCY MEDICINE
Payer: COMMERCIAL

## 2023-02-15 VITALS
BODY MASS INDEX: 30.93 KG/M2 | OXYGEN SATURATION: 99 % | HEIGHT: 66 IN | RESPIRATION RATE: 16 BRPM | SYSTOLIC BLOOD PRESSURE: 110 MMHG | DIASTOLIC BLOOD PRESSURE: 75 MMHG | WEIGHT: 192.46 LBS | TEMPERATURE: 98 F | HEART RATE: 69 BPM

## 2023-02-15 DIAGNOSIS — M25.562 PAIN IN BOTH KNEES, UNSPECIFIED CHRONICITY: ICD-10-CM

## 2023-02-15 DIAGNOSIS — R11.2 NAUSEA AND VOMITING, UNSPECIFIED VOMITING TYPE: ICD-10-CM

## 2023-02-15 DIAGNOSIS — M25.561 PAIN IN BOTH KNEES, UNSPECIFIED CHRONICITY: ICD-10-CM

## 2023-02-15 DIAGNOSIS — R10.84 GENERALIZED ABDOMINAL PAIN: ICD-10-CM

## 2023-02-15 DIAGNOSIS — R51.9 NONINTRACTABLE HEADACHE, UNSPECIFIED CHRONICITY PATTERN, UNSPECIFIED HEADACHE TYPE: ICD-10-CM

## 2023-02-15 LAB
ALBUMIN SERPL BCP-MCNC: 4.4 G/DL (ref 3.2–4.9)
ALBUMIN/GLOB SERPL: 1.3 G/DL
ALP SERPL-CCNC: 75 U/L (ref 30–99)
ALT SERPL-CCNC: 25 U/L (ref 2–50)
ANION GAP SERPL CALC-SCNC: 14 MMOL/L (ref 7–16)
APPEARANCE UR: ABNORMAL
AST SERPL-CCNC: 22 U/L (ref 12–45)
BACTERIA #/AREA URNS HPF: ABNORMAL /HPF
BASOPHILS # BLD AUTO: 0.6 % (ref 0–1.8)
BASOPHILS # BLD: 0.05 K/UL (ref 0–0.12)
BILIRUB SERPL-MCNC: 0.4 MG/DL (ref 0.1–1.5)
BILIRUB UR QL STRIP.AUTO: ABNORMAL
BUN SERPL-MCNC: 6 MG/DL (ref 8–22)
CALCIUM ALBUM COR SERPL-MCNC: 8.8 MG/DL (ref 8.5–10.5)
CALCIUM SERPL-MCNC: 9.1 MG/DL (ref 8.4–10.2)
CHLORIDE SERPL-SCNC: 105 MMOL/L (ref 96–112)
CO2 SERPL-SCNC: 20 MMOL/L (ref 20–33)
COLOR UR: YELLOW
CREAT SERPL-MCNC: 0.52 MG/DL (ref 0.5–1.4)
EOSINOPHIL # BLD AUTO: 0.27 K/UL (ref 0–0.51)
EOSINOPHIL NFR BLD: 3.4 % (ref 0–6.9)
EPI CELLS #/AREA URNS HPF: ABNORMAL /HPF
ERYTHROCYTE [DISTWIDTH] IN BLOOD BY AUTOMATED COUNT: 38.1 FL (ref 35.9–50)
GFR SERPLBLD CREATININE-BSD FMLA CKD-EPI: 127 ML/MIN/1.73 M 2
GLOBULIN SER CALC-MCNC: 3.4 G/DL (ref 1.9–3.5)
GLUCOSE SERPL-MCNC: 102 MG/DL (ref 65–99)
GLUCOSE UR STRIP.AUTO-MCNC: NEGATIVE MG/DL
HCG SERPL QL: NEGATIVE
HCT VFR BLD AUTO: 39.8 % (ref 37–47)
HGB BLD-MCNC: 13.5 G/DL (ref 12–16)
IMM GRANULOCYTES # BLD AUTO: 0.01 K/UL (ref 0–0.11)
IMM GRANULOCYTES NFR BLD AUTO: 0.1 % (ref 0–0.9)
KETONES UR STRIP.AUTO-MCNC: ABNORMAL MG/DL
LEUKOCYTE ESTERASE UR QL STRIP.AUTO: ABNORMAL
LIPASE SERPL-CCNC: 23 U/L (ref 7–58)
LYMPHOCYTES # BLD AUTO: 2.21 K/UL (ref 1–4.8)
LYMPHOCYTES NFR BLD: 27.9 % (ref 22–41)
MCH RBC QN AUTO: 28.5 PG (ref 27–33)
MCHC RBC AUTO-ENTMCNC: 33.9 G/DL (ref 33.6–35)
MCV RBC AUTO: 84 FL (ref 81.4–97.8)
MICRO URNS: ABNORMAL
MONOCYTES # BLD AUTO: 0.76 K/UL (ref 0–0.85)
MONOCYTES NFR BLD AUTO: 9.6 % (ref 0–13.4)
MUCOUS THREADS #/AREA URNS HPF: ABNORMAL /HPF
NEUTROPHILS # BLD AUTO: 4.62 K/UL (ref 2–7.15)
NEUTROPHILS NFR BLD: 58.4 % (ref 44–72)
NITRITE UR QL STRIP.AUTO: NEGATIVE
NRBC # BLD AUTO: 0 K/UL
NRBC BLD-RTO: 0 /100 WBC
PH UR STRIP.AUTO: 6 [PH] (ref 5–8)
PLATELET # BLD AUTO: 341 K/UL (ref 164–446)
PMV BLD AUTO: 10.2 FL (ref 9–12.9)
POTASSIUM SERPL-SCNC: 3.5 MMOL/L (ref 3.6–5.5)
PROT SERPL-MCNC: 7.8 G/DL (ref 6–8.2)
PROT UR QL STRIP: NEGATIVE MG/DL
RBC # BLD AUTO: 4.74 M/UL (ref 4.2–5.4)
RBC UR QL AUTO: NEGATIVE
SODIUM SERPL-SCNC: 139 MMOL/L (ref 135–145)
SP GR UR STRIP.AUTO: 1.02
WBC # BLD AUTO: 7.9 K/UL (ref 4.8–10.8)
WBC #/AREA URNS HPF: ABNORMAL /HPF

## 2023-02-15 PROCEDURE — 85025 COMPLETE CBC W/AUTO DIFF WBC: CPT

## 2023-02-15 PROCEDURE — 87086 URINE CULTURE/COLONY COUNT: CPT

## 2023-02-15 PROCEDURE — 74177 CT ABD & PELVIS W/CONTRAST: CPT

## 2023-02-15 PROCEDURE — 84703 CHORIONIC GONADOTROPIN ASSAY: CPT

## 2023-02-15 PROCEDURE — 83690 ASSAY OF LIPASE: CPT

## 2023-02-15 PROCEDURE — 36415 COLL VENOUS BLD VENIPUNCTURE: CPT

## 2023-02-15 PROCEDURE — 80053 COMPREHEN METABOLIC PANEL: CPT

## 2023-02-15 PROCEDURE — 81001 URINALYSIS AUTO W/SCOPE: CPT

## 2023-02-15 PROCEDURE — 96375 TX/PRO/DX INJ NEW DRUG ADDON: CPT

## 2023-02-15 PROCEDURE — 700117 HCHG RX CONTRAST REV CODE 255: Performed by: EMERGENCY MEDICINE

## 2023-02-15 PROCEDURE — 700105 HCHG RX REV CODE 258: Performed by: EMERGENCY MEDICINE

## 2023-02-15 PROCEDURE — 700111 HCHG RX REV CODE 636 W/ 250 OVERRIDE (IP): Performed by: EMERGENCY MEDICINE

## 2023-02-15 PROCEDURE — 96374 THER/PROPH/DIAG INJ IV PUSH: CPT

## 2023-02-15 PROCEDURE — 99285 EMERGENCY DEPT VISIT HI MDM: CPT

## 2023-02-15 RX ORDER — ONDANSETRON 2 MG/ML
4 INJECTION INTRAMUSCULAR; INTRAVENOUS ONCE
Status: COMPLETED | OUTPATIENT
Start: 2023-02-15 | End: 2023-02-15

## 2023-02-15 RX ORDER — PROCHLORPERAZINE EDISYLATE 5 MG/ML
10 INJECTION INTRAMUSCULAR; INTRAVENOUS ONCE
Status: COMPLETED | OUTPATIENT
Start: 2023-02-15 | End: 2023-02-15

## 2023-02-15 RX ORDER — KETOROLAC TROMETHAMINE 30 MG/ML
15 INJECTION, SOLUTION INTRAMUSCULAR; INTRAVENOUS ONCE
Status: COMPLETED | OUTPATIENT
Start: 2023-02-15 | End: 2023-02-15

## 2023-02-15 RX ORDER — ONDANSETRON 4 MG/1
4 TABLET, ORALLY DISINTEGRATING ORAL EVERY 6 HOURS PRN
Qty: 10 TABLET | Refills: 0 | Status: SHIPPED | OUTPATIENT
Start: 2023-02-15 | End: 2023-07-06

## 2023-02-15 RX ORDER — SODIUM CHLORIDE 9 MG/ML
1000 INJECTION, SOLUTION INTRAVENOUS ONCE
Status: COMPLETED | OUTPATIENT
Start: 2023-02-15 | End: 2023-02-15

## 2023-02-15 RX ORDER — DIPHENHYDRAMINE HYDROCHLORIDE 50 MG/ML
25 INJECTION INTRAMUSCULAR; INTRAVENOUS ONCE
Status: COMPLETED | OUTPATIENT
Start: 2023-02-15 | End: 2023-02-15

## 2023-02-15 RX ADMIN — DIPHENHYDRAMINE HYDROCHLORIDE 25 MG: 50 INJECTION INTRAMUSCULAR; INTRAVENOUS at 12:57

## 2023-02-15 RX ADMIN — IOHEXOL 100 ML: 350 INJECTION, SOLUTION INTRAVENOUS at 10:00

## 2023-02-15 RX ADMIN — ONDANSETRON 4 MG: 2 INJECTION INTRAMUSCULAR; INTRAVENOUS at 12:57

## 2023-02-15 RX ADMIN — PROCHLORPERAZINE EDISYLATE 10 MG: 5 INJECTION INTRAMUSCULAR; INTRAVENOUS at 12:57

## 2023-02-15 RX ADMIN — KETOROLAC TROMETHAMINE 15 MG: 30 INJECTION, SOLUTION INTRAMUSCULAR; INTRAVENOUS at 13:02

## 2023-02-15 RX ADMIN — SODIUM CHLORIDE 1000 ML: 9 INJECTION, SOLUTION INTRAVENOUS at 12:56

## 2023-02-15 ASSESSMENT — FIBROSIS 4 INDEX: FIB4 SCORE: 0.48

## 2023-02-15 NOTE — ED PROVIDER NOTES
ED Provider Note    CHIEF COMPLAINT  Chief Complaint   Patient presents with    Vomiting     Reports 12 lb weight loss in 10 days. Denies diarrhea.    Headache    Knee Pain     Bilat knees, hx of RA    Abdominal Pain     Generalized. Denies fevers.       EXTERNAL RECORDS REVIEWED  Outpatient Notes -patient was most recently seen    HPI/ROS  LIMITATION TO HISTORY   Select: : None  OUTSIDE HISTORIAN(S):  None    Madhavi Martinez is a 33 y.o. female with a history of RA who presents with bilateral knee pain, vomiting, headache, generalized abdominal pain, and unintentional weight loss that has been ongoing for the past 2 weeks.  Patient reports she feels hungry and can hear her stomach growl but as soon as she eats she feels almost immediately full.  She has been vomiting nearly daily.  Yesterday she had an episode of red vomit and does not think that she ate anything red prior to her emesis.  She otherwise denies any hematemesis.  She also has a headache and some generalized abdominal discomfort but denies any fevers or chills, chest pain or shortness of breath, dysuria, diarrhea, or constipation.  She notes that her symptoms feel similar to those she has had in prior pregnancies but took a home pregnancy test which was negative.  She denies any suicidal or homicidal ideation but notes that she has felt suicidal in the past.  She takes leflunomide for her RA and otherwise has not tried any medication for her symptoms.  Notably, she reports that she has trialed gabapentin for her knee pain in the past without improvement.  Her knee pain feels as though somebody is pulling her knee and letting them slap back onto her legs.  She denies any trauma.    PAST MEDICAL HISTORY   has a past medical history of Dermatographia and Rheumatoid arthritis (HCC).    SURGICAL HISTORY  patient denies any surgical history    FAMILY HISTORY  History reviewed. No pertinent family history.    SOCIAL HISTORY  Social History     Tobacco Use     "Smoking status: Never    Smokeless tobacco: Never   Vaping Use    Vaping Use: Never used   Substance and Sexual Activity    Alcohol use: Not Currently    Drug use: Not Currently     Types: Marijuana, Oral    Sexual activity: Not on file       CURRENT MEDICATIONS  Home Medications       Reviewed by Ivonne Darden R.N. (Registered Nurse) on 02/15/23 at 1108  Med List Status: Not Addressed     Medication Last Dose Status   cefdinir (OMNICEF) 300 MG Cap  Active   folic acid (FOLVITE) 1 MG Tab  Active   gabapentin (NEURONTIN) 300 MG Cap  Active   hydrOXYzine HCl (ATARAX) 25 MG Tab  Active   leflunomide (ARAVA) 20 MG Tab  Active                    ALLERGIES  No Known Allergies    PHYSICAL EXAM  VITAL SIGNS: /66   Pulse 99   Temp 36.8 °C (98.3 °F) (Temporal)   Resp 18   Ht 1.676 m (5' 6\")   Wt 87.3 kg (192 lb 7.4 oz)   SpO2 94%   BMI 31.06 kg/m²    Physical Exam  Vitals and nursing note reviewed.   Constitutional:       Appearance: She is well-developed.   HENT:      Head: Normocephalic and atraumatic.      Mouth/Throat:      Comments: Dry mucous membranes  Cardiovascular:      Rate and Rhythm: Normal rate and regular rhythm.      Heart sounds: Normal heart sounds.   Pulmonary:      Effort: Pulmonary effort is normal. No respiratory distress.      Breath sounds: Normal breath sounds. No stridor. No wheezing, rhonchi or rales.   Abdominal:      Palpations: Abdomen is soft.      Tenderness: There is no abdominal tenderness.   Musculoskeletal:         General: Normal range of motion.      Cervical back: Normal range of motion and neck supple.      Comments: No lower extremity swelling, erythema, or tenderness.  No obvious deformity.   Skin:     General: Skin is warm and dry.   Neurological:      Mental Status: She is alert.      Comments: Normal speech and vision. CN II-XII grossly intact. Normal finger to nose and heel to shin. No dysdiadochokinesia. Normal strength and sensation. No pronator drift. No focal " deficit.   Psychiatric:         Mood and Affect: Mood normal.         Behavior: Behavior normal.     DIAGNOSTIC STUDIES / PROCEDURES  LABS  Results for orders placed or performed during the hospital encounter of 02/15/23   CBC WITH DIFFERENTIAL   Result Value Ref Range    WBC 7.9 4.8 - 10.8 K/uL    RBC 4.74 4.20 - 5.40 M/uL    Hemoglobin 13.5 12.0 - 16.0 g/dL    Hematocrit 39.8 37.0 - 47.0 %    MCV 84.0 81.4 - 97.8 fL    MCH 28.5 27.0 - 33.0 pg    MCHC 33.9 33.6 - 35.0 g/dL    RDW 38.1 35.9 - 50.0 fL    Platelet Count 341 164 - 446 K/uL    MPV 10.2 9.0 - 12.9 fL    Neutrophils-Polys 58.40 44.00 - 72.00 %    Lymphocytes 27.90 22.00 - 41.00 %    Monocytes 9.60 0.00 - 13.40 %    Eosinophils 3.40 0.00 - 6.90 %    Basophils 0.60 0.00 - 1.80 %    Immature Granulocytes 0.10 0.00 - 0.90 %    Nucleated RBC 0.00 /100 WBC    Neutrophils (Absolute) 4.62 2.00 - 7.15 K/uL    Lymphs (Absolute) 2.21 1.00 - 4.80 K/uL    Monos (Absolute) 0.76 0.00 - 0.85 K/uL    Eos (Absolute) 0.27 0.00 - 0.51 K/uL    Baso (Absolute) 0.05 0.00 - 0.12 K/uL    Immature Granulocytes (abs) 0.01 0.00 - 0.11 K/uL    NRBC (Absolute) 0.00 K/uL   Comp Metabolic Panel   Result Value Ref Range    Sodium 139 135 - 145 mmol/L    Potassium 3.5 (L) 3.6 - 5.5 mmol/L    Chloride 105 96 - 112 mmol/L    Co2 20 20 - 33 mmol/L    Anion Gap 14.0 7.0 - 16.0    Glucose 102 (H) 65 - 99 mg/dL    Bun 6 (L) 8 - 22 mg/dL    Creatinine 0.52 0.50 - 1.40 mg/dL    Calcium 9.1 8.4 - 10.2 mg/dL    AST(SGOT) 22 12 - 45 U/L    ALT(SGPT) 25 2 - 50 U/L    Alkaline Phosphatase 75 30 - 99 U/L    Total Bilirubin 0.4 0.1 - 1.5 mg/dL    Albumin 4.4 3.2 - 4.9 g/dL    Total Protein 7.8 6.0 - 8.2 g/dL    Globulin 3.4 1.9 - 3.5 g/dL    A-G Ratio 1.3 g/dL   LIPASE   Result Value Ref Range    Lipase 23 7 - 58 U/L   URINALYSIS    Specimen: Urine, Clean Catch   Result Value Ref Range    Color Yellow     Character Cloudy (A)     Specific Gravity 1.025 <1.035    Ph 6.0 5.0 - 8.0    Glucose Negative  Negative mg/dL    Ketones Trace (A) Negative mg/dL    Protein Negative Negative mg/dL    Bilirubin Small (A) Negative    Nitrite Negative Negative    Leukocyte Esterase Moderate (A) Negative    Occult Blood Negative Negative    Micro Urine Req Microscopic    HCG QUAL SERUM   Result Value Ref Range    Beta-Hcg Qualitative Serum Negative Negative   URINE MICROSCOPIC (W/UA)   Result Value Ref Range    WBC 20-50 (A) /hpf    Bacteria Few (A) None /hpf    Epithelial Cells Moderate (A) Few /hpf    Mucous Threads Few /hpf   CORRECTED CALCIUM   Result Value Ref Range    Correct Calcium 8.8 8.5 - 10.5 mg/dL   ESTIMATED GFR   Result Value Ref Range    GFR (CKD-EPI) 127 >60 mL/min/1.73 m 2     RADIOLOGY  CT-ABDOMEN-PELVIS WITH   Final Result      1.  No evidence of bowel obstruction or focal inflammatory change.      2.  Fatty liver.      3.  IUD present.        COURSE & MEDICAL DECISION MAKING    ED Observation Status? No; Patient does not meet criteria for ED Observation.     INITIAL ASSESSMENT, COURSE AND PLAN  Care Narrative: This is a 33 year old female who is here with multiple concerns including headache, nausea and vomiting, abdominal pain, and bilateral knee pain in the context of chronic joint pain due to RA.    She arrives borderline tachycardic but AF with otherwise normal VS. She has a normal and non-focal neurologic exam. Not the worst headache of her life and no trauma to suggest ICH. Low suspicion for SAH. No fevers, neck pain or stiffness, unlikely to represent CNS infection. Given normal neuro exam I have a low suspicion for neoplasm/mass. No photophobia/phonophobia. Less likely to be due to migraine but will trial benadryl, toradol, compazine as well as IV fluids for pain management. Abdomen is soft without significant tenderness or peritoneal signs but given objective weight loss (during outpatient visit 2/5 her weight was 92.5kg and today's weight is 87.3kg) will obtain CT abd/pelvis. Patient does use  marijuana and it's possible this represents CHS. Regarding her knee pain, she has had no trauma. There is no erythema or edema. She can range the knees without difficulty. Low suspicion for cellulitis or septic joint. Pain is consistent with prior flares of her RA which is being followed by rheumatology.    CBC is reassuring without leukocytosis or left shift which further lowers my suspicion for infectious etiology. Metabolic panel demonstrates mild hypokalemia to 3.5 which does not require repletion today. She has normal renal and liver function. Lipase is normal. UA reveals moderate leukocytes and few bacteria but is a contaminated specimen. She has no urinary symptoms whatsoever so I will send urine for culture and if positive will call in a prescription for abx. HCG is negative.    CT thankfully without acute abnormality.    Patient reevaluated at bedside. HA is completely resolved. She feels significantly improved. She has tolerated PO. No episodes of emesis in the ED. Safe for d/c with close outpatient follow-up. Discharged in good and stable condition with strict return precautions and prescription for zofran.    HYDRATION: Based on the patient's presentation of Dehydration the patient was given IV fluids. IV Hydration was used because oral hydration was not adequate alone. Upon recheck following hydration, the patient was improved.    ADDITIONAL PROBLEM LIST  None    DISPOSITION AND DISCUSSIONS  I have discussed management of the patient with the following physicians and DEXTER's:  None    Discussion of management with other QHP or appropriate source(s): None     Escalation of care considered, and ultimately not performed:acute inpatient care management, however at this time, the patient is most appropriate for outpatient management    Barriers to care at this time, including but not limited to:  None .     Decision tools and prescription drugs considered including, but not limited to:  None .    FINAL  DIAGNOSIS  1. Nausea and vomiting, unspecified vomiting type    2. Nonintractable headache, unspecified chronicity pattern, unspecified headache type    3. Generalized abdominal pain    4. Pain in both knees, unspecified chronicity      Electronically signed by: Tk Patel M.D., 2/15/2023 11:29 AM

## 2023-02-15 NOTE — ED NOTES
Pt given d/c paperwork and RX p/u information. Pt verbalized understanding all ifnformation given. Pt ambulated out of the ER w/o difficulty w/ family

## 2023-02-15 NOTE — DISCHARGE INSTRUCTIONS
You were seen in the ER for headache, nausea and vomiting, knee pain, and abdominal pain.  Thankfully your labs and imaging are reassuring although your urinalysis did have some bacteria in it.  We have sent a culture off and we will call you with a prescription for antibiotics if it is positive as it appears to be a contaminated specimen.  Until then please lots of clear liquids, at least 8 ounces every hour to maintain your hydration.  I have sent in a prescription for nausea medication on your behalf, take it as directed.  Return with new or worsening symptoms.  I hope you feel better soon!

## 2023-02-17 LAB
BACTERIA UR CULT: NORMAL
SIGNIFICANT IND 70042: NORMAL
SITE SITE: NORMAL
SOURCE SOURCE: NORMAL

## 2023-02-23 ENCOUNTER — OFFICE VISIT (OUTPATIENT)
Dept: MEDICAL GROUP | Facility: MEDICAL CENTER | Age: 33
End: 2023-02-23
Payer: COMMERCIAL

## 2023-02-23 VITALS
HEIGHT: 66 IN | OXYGEN SATURATION: 97 % | BODY MASS INDEX: 31.98 KG/M2 | DIASTOLIC BLOOD PRESSURE: 70 MMHG | TEMPERATURE: 98 F | SYSTOLIC BLOOD PRESSURE: 110 MMHG | HEART RATE: 77 BPM | WEIGHT: 199 LBS

## 2023-02-23 DIAGNOSIS — E66.9 OBESITY (BMI 30-39.9): Chronic | ICD-10-CM

## 2023-02-23 DIAGNOSIS — Z76.89 ENCOUNTER TO ESTABLISH CARE: ICD-10-CM

## 2023-02-23 DIAGNOSIS — M05.9 SEROPOSITIVE RHEUMATOID ARTHRITIS (HCC): ICD-10-CM

## 2023-02-23 DIAGNOSIS — F43.20 ADJUSTMENT DISORDER, UNSPECIFIED TYPE: ICD-10-CM

## 2023-02-23 PROBLEM — Z12.4 CERVICAL CANCER SCREENING: Status: RESOLVED | Noted: 2022-06-02 | Resolved: 2023-02-23

## 2023-02-23 PROCEDURE — 99213 OFFICE O/P EST LOW 20 MIN: CPT | Performed by: NURSE PRACTITIONER

## 2023-02-23 SDOH — HEALTH STABILITY: PHYSICAL HEALTH: ON AVERAGE, HOW MANY DAYS PER WEEK DO YOU ENGAGE IN MODERATE TO STRENUOUS EXERCISE (LIKE A BRISK WALK)?: 3 DAYS

## 2023-02-23 SDOH — ECONOMIC STABILITY: INCOME INSECURITY: HOW HARD IS IT FOR YOU TO PAY FOR THE VERY BASICS LIKE FOOD, HOUSING, MEDICAL CARE, AND HEATING?: NOT VERY HARD

## 2023-02-23 SDOH — ECONOMIC STABILITY: FOOD INSECURITY: WITHIN THE PAST 12 MONTHS, THE FOOD YOU BOUGHT JUST DIDN'T LAST AND YOU DIDN'T HAVE MONEY TO GET MORE.: NEVER TRUE

## 2023-02-23 SDOH — ECONOMIC STABILITY: FOOD INSECURITY: WITHIN THE PAST 12 MONTHS, YOU WORRIED THAT YOUR FOOD WOULD RUN OUT BEFORE YOU GOT MONEY TO BUY MORE.: NEVER TRUE

## 2023-02-23 SDOH — ECONOMIC STABILITY: TRANSPORTATION INSECURITY
IN THE PAST 12 MONTHS, HAS THE LACK OF TRANSPORTATION KEPT YOU FROM MEDICAL APPOINTMENTS OR FROM GETTING MEDICATIONS?: NO

## 2023-02-23 SDOH — ECONOMIC STABILITY: TRANSPORTATION INSECURITY
IN THE PAST 12 MONTHS, HAS LACK OF TRANSPORTATION KEPT YOU FROM MEETINGS, WORK, OR FROM GETTING THINGS NEEDED FOR DAILY LIVING?: NO

## 2023-02-23 SDOH — ECONOMIC STABILITY: HOUSING INSECURITY
IN THE LAST 12 MONTHS, WAS THERE A TIME WHEN YOU DID NOT HAVE A STEADY PLACE TO SLEEP OR SLEPT IN A SHELTER (INCLUDING NOW)?: NO

## 2023-02-23 SDOH — ECONOMIC STABILITY: INCOME INSECURITY: IN THE LAST 12 MONTHS, WAS THERE A TIME WHEN YOU WERE NOT ABLE TO PAY THE MORTGAGE OR RENT ON TIME?: NO

## 2023-02-23 SDOH — HEALTH STABILITY: PHYSICAL HEALTH: ON AVERAGE, HOW MANY MINUTES DO YOU ENGAGE IN EXERCISE AT THIS LEVEL?: 30 MIN

## 2023-02-23 SDOH — HEALTH STABILITY: MENTAL HEALTH
STRESS IS WHEN SOMEONE FEELS TENSE, NERVOUS, ANXIOUS, OR CAN'T SLEEP AT NIGHT BECAUSE THEIR MIND IS TROUBLED. HOW STRESSED ARE YOU?: VERY MUCH

## 2023-02-23 SDOH — ECONOMIC STABILITY: HOUSING INSECURITY: IN THE LAST 12 MONTHS, HOW MANY PLACES HAVE YOU LIVED?: 1

## 2023-02-23 SDOH — ECONOMIC STABILITY: TRANSPORTATION INSECURITY
IN THE PAST 12 MONTHS, HAS LACK OF RELIABLE TRANSPORTATION KEPT YOU FROM MEDICAL APPOINTMENTS, MEETINGS, WORK OR FROM GETTING THINGS NEEDED FOR DAILY LIVING?: NO

## 2023-02-23 ASSESSMENT — SOCIAL DETERMINANTS OF HEALTH (SDOH)
WITHIN THE PAST 12 MONTHS, YOU WORRIED THAT YOUR FOOD WOULD RUN OUT BEFORE YOU GOT THE MONEY TO BUY MORE: NEVER TRUE
HOW OFTEN DO YOU HAVE SIX OR MORE DRINKS ON ONE OCCASION: LESS THAN MONTHLY
DO YOU BELONG TO ANY CLUBS OR ORGANIZATIONS SUCH AS CHURCH GROUPS UNIONS, FRATERNAL OR ATHLETIC GROUPS, OR SCHOOL GROUPS?: PATIENT DECLINED
HOW OFTEN DO YOU GET TOGETHER WITH FRIENDS OR RELATIVES?: ONCE A WEEK
IN A TYPICAL WEEK, HOW MANY TIMES DO YOU TALK ON THE PHONE WITH FAMILY, FRIENDS, OR NEIGHBORS?: MORE THAN THREE TIMES A WEEK
DO YOU BELONG TO ANY CLUBS OR ORGANIZATIONS SUCH AS CHURCH GROUPS UNIONS, FRATERNAL OR ATHLETIC GROUPS, OR SCHOOL GROUPS?: PATIENT DECLINED
HOW HARD IS IT FOR YOU TO PAY FOR THE VERY BASICS LIKE FOOD, HOUSING, MEDICAL CARE, AND HEATING?: NOT VERY HARD
HOW OFTEN DO YOU ATTEND CHURCH OR RELIGIOUS SERVICES?: NEVER
HOW MANY DRINKS CONTAINING ALCOHOL DO YOU HAVE ON A TYPICAL DAY WHEN YOU ARE DRINKING: 1 OR 2
HOW OFTEN DO YOU ATTENT MEETINGS OF THE CLUB OR ORGANIZATION YOU BELONG TO?: PATIENT DECLINED
HOW OFTEN DO YOU ATTENT MEETINGS OF THE CLUB OR ORGANIZATION YOU BELONG TO?: PATIENT DECLINED
HOW OFTEN DO YOU GET TOGETHER WITH FRIENDS OR RELATIVES?: ONCE A WEEK
HOW OFTEN DO YOU HAVE A DRINK CONTAINING ALCOHOL: 2-4 TIMES A MONTH
IN A TYPICAL WEEK, HOW MANY TIMES DO YOU TALK ON THE PHONE WITH FAMILY, FRIENDS, OR NEIGHBORS?: MORE THAN THREE TIMES A WEEK
HOW OFTEN DO YOU ATTEND CHURCH OR RELIGIOUS SERVICES?: NEVER

## 2023-02-23 ASSESSMENT — FIBROSIS 4 INDEX: FIB4 SCORE: .4

## 2023-02-23 ASSESSMENT — LIFESTYLE VARIABLES
HOW MANY STANDARD DRINKS CONTAINING ALCOHOL DO YOU HAVE ON A TYPICAL DAY: 1 OR 2
HOW OFTEN DO YOU HAVE A DRINK CONTAINING ALCOHOL: 2-4 TIMES A MONTH
SKIP TO QUESTIONS 9-10: 0
HOW OFTEN DO YOU HAVE SIX OR MORE DRINKS ON ONE OCCASION: LESS THAN MONTHLY
AUDIT-C TOTAL SCORE: 3

## 2023-02-23 NOTE — PROGRESS NOTES
Chief Complaint   Patient presents with    Establish Care     Prior PCP Ismael Le M.D.    Referral Needed     Rheumatology      Madhavi Martinez is a 33 y.o. female here to establish care and to discuss the evaluation and management of:    Seropositive Rheumatoid Arthritis (Hcc)    Chronic problem for the patient.  Diagnosed:2022  Medications :Leflunomide 20 mg daily, folic acid 1 mg daily.  Body pain, joint pain and generalized inflammation improved with Leflunomide.   Per chart review :Hx of adverse side effects and insufficient response with sulfasalazine she had increased suicidal ideation.       Patient does mention that she is hopeful to establish with a counselor in the next few weeks.  She does have a significant history with her family as well as her child's father who unfortunately carried out suicide.  There has been some difficulties over the last few years.  She was quite concerned recently as she did have increased suicidal ideations while taking sulfasalazine.  This caused quite a bit of fear for the patient as she has 2 young children and was not able to handle the thoughts and the feelings very well.      ROS: SEE ABOVE        Current Outpatient Medications:     leflunomide (ARAVA) 20 MG Tab, Take 1 Tablet by mouth every day., Disp: 90 Tablet, Rfl: 3    ondansetron (ZOFRAN ODT) 4 MG TABLET DISPERSIBLE, Take 1 Tablet by mouth every 6 hours as needed for Nausea/Vomiting., Disp: 10 Tablet, Rfl: 0    folic acid (FOLVITE) 1 MG Tab, Take 1 mg by mouth every day., Disp: , Rfl:     hydrOXYzine HCl (ATARAX) 25 MG Tab, Take 1 Tablet by mouth 3 times a day as needed for Itching., Disp: 30 Tablet, Rfl: 3    gabapentin (NEURONTIN) 300 MG Cap, Take 1 Capsule by mouth 3 times a day., Disp: 90 Capsule, Rfl: 3    No Known Allergies    Past Medical History:   Diagnosis Date    Anxiety     Depression     Dermatographia     Migraine     Rheumatoid arthritis (HCC)      History reviewed. No pertinent surgical  history.  Family History   Problem Relation Age of Onset    Cancer Maternal Grandfather         lymphoma    Cancer Paternal Grandmother         colon cancer    No Known Problems Paternal Grandfather      Social History     Socioeconomic History    Marital status:      Spouse name: Not on file    Number of children: Not on file    Years of education: Not on file    Highest education level: GED or equivalent   Occupational History    Not on file   Tobacco Use    Smoking status: Never    Smokeless tobacco: Never   Vaping Use    Vaping Use: Never used   Substance and Sexual Activity    Alcohol use: Yes     Alcohol/week: 1.2 oz     Types: 2 Standard drinks or equivalent per week    Drug use: Yes     Types: Marijuana, Oral    Sexual activity: Yes     Partners: Male     Birth control/protection: I.U.D.     Comment: Mirena   Other Topics Concern    Not on file   Social History Narrative    Not on file     Social Determinants of Health     Financial Resource Strain: Low Risk     Difficulty of Paying Living Expenses: Not very hard   Food Insecurity: No Food Insecurity    Worried About Running Out of Food in the Last Year: Never true    Ran Out of Food in the Last Year: Never true   Transportation Needs: No Transportation Needs    Lack of Transportation (Medical): No    Lack of Transportation (Non-Medical): No   Physical Activity: Insufficiently Active    Days of Exercise per Week: 3 days    Minutes of Exercise per Session: 30 min   Stress: Stress Concern Present    Feeling of Stress : Very much   Social Connections: Unknown    Frequency of Communication with Friends and Family: More than three times a week    Frequency of Social Gatherings with Friends and Family: Once a week    Attends Congregational Services: Never    Active Member of Clubs or Organizations: Patient refused    Attends Club or Organization Meetings: Patient refused    Marital Status:    Intimate Partner Violence: Not on file   Housing Stability: Low  "Risk     Unable to Pay for Housing in the Last Year: No    Number of Places Lived in the Last Year: 1    Unstable Housing in the Last Year: No       Objective:     Vitals: /70 (BP Location: Right arm, Patient Position: Sitting, BP Cuff Size: Adult)   Pulse 77   Temp 36.7 °C (98 °F) (Temporal)   Ht 1.676 m (5' 6\")   Wt 90.3 kg (199 lb)   SpO2 97%   BMI 32.12 kg/m²      General: Alert, pleasant, NAD  HEENT:  Normocephalic.  Neck supple.  No thyromegaly or masses palpated. No cervical or supraclavicular lymphadenopathy.  Heart:  Regular rate and rhythm.  S1 and S2 normal.  No murmurs appreciated.    Respiratory:  Normal respiratory effort.  Clear to auscultation bilaterally.    Skin:  Warm, dry, no rashes  Musculoskeletal:  Gait is normal.  Moves all extremities well.  Extremities:   No leg edema.  Neurological: No tremors  Psych:  Affect/mood is normal, judgement is good, memory is intact, grooming is appropriate.    Assessment and Plan.     33 y.o. female to establish care and discuss the followin. Seropositive rheumatoid arthritis (HCC)  Chronic problem, symptoms are stable at this time on  Leflunomide.   Recommend following up with rheumatology as scheduled in .    2. Adjustment disorder, unspecified type  Ongoing problem for the patient.  Not well controlled at this time however she is hopeful that she will get into counseling in the next few weeks.  I do agree that this would be very helpful for her given the conversation we had in the clinic today she does have some underlying trauma and possible PTSD.     At this time she would like to avoid medications if possible at this time  Have asked her to follow back up in 3 months we can reassess.    3. Obesity (BMI 30-39.9)  - Patient identified as having weight management issue.  Appropriate orders and counseling given.    4. Encounter to establish care  Requesting records from [facility] for her last Pap smear.      Return in about 4 " months (around 6/23/2023).          Anabella MERCEDES

## 2023-02-23 NOTE — LETTER
[facility]  DIANNE Sin.P.R.N.  [address] [facility] [address]  [place] [address]  Fax: [phone]   Authorization for Release/Disclosure of   Protected Health Information   Name: SLOANE SHIELDS : 1990 SSN: [id number]   Address: [address] [place] [address]   [place] [address] Phone:    [phone] (home)    I authorize the entity listed below to release/disclose the PHI below to:   [facility]/Anabella Holden A.P.R.N. and DIANNE Sin.P.R.N.   Provider or Entity Name:  [facility]   Address   City, State, Zip   Phone:      Fax:     Reason for request: continuity of care   Information to be released:    [  ] LAST COLONOSCOPY,  including any PATH REPORT and follow-up  [  ] LAST FIT/COLOGUARD RESULT [  ] LAST DEXA  [  ] LAST MAMMOGRAM  [ x ] LAST PAP  [  ] LAST LABS [  ] RETINA EXAM REPORT  [  ] IMMUNIZATION RECORDS  [ x] Release all info    (X) IUD placement       [  ] Check here and initial the line next to each item to release ALL health information INCLUDING  _____ Care and treatment for drug and / or alcohol abuse  _____ HIV testing, infection status, or AIDS  _____ Genetic Testing    DATES OF SERVICE OR TIME PERIOD TO BE DISCLOSED: _____________  I understand and acknowledge that:  * This Authorization may be revoked at any time by you in writing, except if your health information has already been used or disclosed.  * Your health information that will be used or disclosed as a result of you signing this authorization could be re-disclosed by the recipient. If this occurs, your re-disclosed health information may no longer be protected by State or Federal laws.  * You may refuse to sign this Authorization. Your refusal will not affect your ability to obtain treatment.  * This Authorization becomes effective upon signing and will  on (date) __________.      If no date is indicated, this Authorization will  one (1) year from the signature date.    Name: Sloane  Juan  Signature: Date:   2/23/2023     PLEASE FAX REQUESTED RECORDS BACK TO: [phone]

## 2023-04-06 PROBLEM — Z79.60 LONG-TERM USE OF IMMUNOSUPPRESSANT MEDICATION: Chronic | Status: ACTIVE | Noted: 2022-04-28

## 2023-04-27 DIAGNOSIS — R21 RASH AND OTHER NONSPECIFIC SKIN ERUPTION: ICD-10-CM

## 2023-04-28 DIAGNOSIS — R21 RASH AND OTHER NONSPECIFIC SKIN ERUPTION: ICD-10-CM

## 2023-04-28 RX ORDER — METHYLPREDNISOLONE 4 MG/1
TABLET ORAL
Qty: 21 TABLET | Refills: 0 | Status: SHIPPED | OUTPATIENT
Start: 2023-04-28 | End: 2023-07-06

## 2023-04-29 RX ORDER — HYDROXYZINE HYDROCHLORIDE 25 MG/1
25 TABLET, FILM COATED ORAL 3 TIMES DAILY PRN
Qty: 30 TABLET | Refills: 3 | Status: SHIPPED | OUTPATIENT
Start: 2023-04-29 | End: 2023-09-27 | Stop reason: SDUPTHER

## 2023-05-05 ENCOUNTER — HOSPITAL ENCOUNTER (OUTPATIENT)
Dept: LAB | Facility: MEDICAL CENTER | Age: 33
End: 2023-05-05
Attending: FAMILY MEDICINE
Payer: COMMERCIAL

## 2023-05-05 DIAGNOSIS — Z11.59 ENCOUNTER FOR HEPATITIS C SCREENING TEST FOR LOW RISK PATIENT: ICD-10-CM

## 2023-05-05 LAB — HCV AB SER QL: NORMAL

## 2023-05-05 PROCEDURE — 86803 HEPATITIS C AB TEST: CPT

## 2023-05-05 PROCEDURE — 36415 COLL VENOUS BLD VENIPUNCTURE: CPT

## 2023-07-06 ENCOUNTER — OFFICE VISIT (OUTPATIENT)
Dept: MEDICAL GROUP | Facility: MEDICAL CENTER | Age: 33
End: 2023-07-06
Payer: COMMERCIAL

## 2023-07-06 VITALS
WEIGHT: 189 LBS | OXYGEN SATURATION: 98 % | BODY MASS INDEX: 30.37 KG/M2 | SYSTOLIC BLOOD PRESSURE: 110 MMHG | DIASTOLIC BLOOD PRESSURE: 62 MMHG | HEART RATE: 60 BPM | TEMPERATURE: 97.8 F | HEIGHT: 66 IN

## 2023-07-06 DIAGNOSIS — F33.1 MODERATE EPISODE OF RECURRENT MAJOR DEPRESSIVE DISORDER (HCC): ICD-10-CM

## 2023-07-06 DIAGNOSIS — L65.9 HAIR LOSS: ICD-10-CM

## 2023-07-06 DIAGNOSIS — F41.1 GAD (GENERALIZED ANXIETY DISORDER): ICD-10-CM

## 2023-07-06 DIAGNOSIS — G47.00 INSOMNIA, UNSPECIFIED TYPE: ICD-10-CM

## 2023-07-06 DIAGNOSIS — G25.81 RLS (RESTLESS LEGS SYNDROME): ICD-10-CM

## 2023-07-06 DIAGNOSIS — R06.83 SNORING: ICD-10-CM

## 2023-07-06 DIAGNOSIS — R41.3 MEMORY IMPAIRMENT: ICD-10-CM

## 2023-07-06 DIAGNOSIS — E55.9 VITAMIN D DEFICIENCY: ICD-10-CM

## 2023-07-06 PROBLEM — F32.9 MAJOR DEPRESSIVE DISORDER: Status: ACTIVE | Noted: 2023-07-06

## 2023-07-06 PROCEDURE — 3078F DIAST BP <80 MM HG: CPT | Performed by: NURSE PRACTITIONER

## 2023-07-06 PROCEDURE — 99214 OFFICE O/P EST MOD 30 MIN: CPT | Performed by: NURSE PRACTITIONER

## 2023-07-06 PROCEDURE — 3074F SYST BP LT 130 MM HG: CPT | Performed by: NURSE PRACTITIONER

## 2023-07-06 RX ORDER — TRAZODONE HYDROCHLORIDE 50 MG/1
50 TABLET ORAL NIGHTLY PRN
Qty: 90 TABLET | Refills: 1 | Status: SHIPPED | OUTPATIENT
Start: 2023-07-06

## 2023-07-06 RX ORDER — SERTRALINE HYDROCHLORIDE 25 MG/1
25 TABLET, FILM COATED ORAL DAILY
Qty: 90 TABLET | Refills: 1 | Status: SHIPPED | OUTPATIENT
Start: 2023-07-06 | End: 2023-11-15

## 2023-07-06 ASSESSMENT — PATIENT HEALTH QUESTIONNAIRE - PHQ9
SUM OF ALL RESPONSES TO PHQ QUESTIONS 1-9: 16
CLINICAL INTERPRETATION OF PHQ2 SCORE: 3
5. POOR APPETITE OR OVEREATING: 3 - NEARLY EVERY DAY

## 2023-07-06 ASSESSMENT — ANXIETY QUESTIONNAIRES
7. FEELING AFRAID AS IF SOMETHING AWFUL MIGHT HAPPEN: MORE THAN HALF THE DAYS
1. FEELING NERVOUS, ANXIOUS, OR ON EDGE: NEARLY EVERY DAY
6. BECOMING EASILY ANNOYED OR IRRITABLE: MORE THAN HALF THE DAYS
4. TROUBLE RELAXING: SEVERAL DAYS
3. WORRYING TOO MUCH ABOUT DIFFERENT THINGS: NEARLY EVERY DAY
IF YOU CHECKED OFF ANY PROBLEMS ON THIS QUESTIONNAIRE, HOW DIFFICULT HAVE THESE PROBLEMS MADE IT FOR YOU TO DO YOUR WORK, TAKE CARE OF THINGS AT HOME, OR GET ALONG WITH OTHER PEOPLE: EXTREMELY DIFFICULT
2. NOT BEING ABLE TO STOP OR CONTROL WORRYING: NEARLY EVERY DAY
GAD7 TOTAL SCORE: 17
5. BEING SO RESTLESS THAT IT IS HARD TO SIT STILL: NEARLY EVERY DAY

## 2023-07-06 ASSESSMENT — FIBROSIS 4 INDEX: FIB4 SCORE: .4

## 2023-07-06 NOTE — PROGRESS NOTES
Chief Complaint   Patient presents with    Poor Memory     For a couple mths    Anxiety     CITLALLI-7: 17     Subjective:     HPI:     Madhavi Martinez is a 33 y.o. female here to discuss the following:    Patient presents today accompanied by her significant other.    Multiple concerns today.  Primary concern is that she has been forgetting conversations she has had.  She reports losing track of her bill payments which is not normal for her.  Forgetting what she is doing after going into her room.  Sometimes during mid conversation she will forget the subject she is talking about.  No recent trauma or injury, head injury.    She reports having trouble sleeping for the last several months.  Is restless prior to bed and during sleep.  Waking up multiple times throughout the night.  Her significant other does report snoring.  Denies apneic episodes.  She does mention that sometimes her lower back pain can also keep her up.    Her anxiety is not well controlled.  CITLALLI-7 was 17.  PHQ-9 was 16.  Denies any thoughts of suicidal ideation.  Has not been on medication in the past.  Currently going to a counselor which she just recently started.    She has noted some hair loss.    She currently takes hydroxyzine as needed for itching.    ROS: : see above        Current Outpatient Medications:     traZODone (DESYREL) 50 MG Tab, Take 1 Tablet by mouth at bedtime as needed for Sleep., Disp: 90 Tablet, Rfl: 1    sertraline (ZOLOFT) 25 MG tablet, Take 1 Tablet by mouth every day., Disp: 90 Tablet, Rfl: 1    hydrOXYzine HCl (ATARAX) 25 MG Tab, Take 1 Tablet by mouth 3 times a day as needed for Itching., Disp: 30 Tablet, Rfl: 3    leflunomide (ARAVA) 20 MG Tab, Take 1 Tablet by mouth every day., Disp: 90 Tablet, Rfl: 3    folic acid (FOLVITE) 1 MG Tab, Take 1 mg by mouth every day., Disp: , Rfl:     gabapentin (NEURONTIN) 300 MG Cap, Take 1 Capsule by mouth 3 times a day., Disp: 90 Capsule, Rfl: 3    No Known Allergies    Objective:  "    Vitals: /62 (BP Location: Left arm, Patient Position: Sitting, BP Cuff Size: Adult)   Pulse 60   Temp 36.6 °C (97.8 °F) (Temporal)   Ht 1.676 m (5' 6\")   Wt 85.7 kg (189 lb)   SpO2 98%   BMI 30.51 kg/m²    General: Alert, pleasant, NAD  HEENT: Normocephalic.  Neck supple.   Respiratory: no distress, no audible wheezing, RR -WNL  Skin: Warm, dry, no rashes.  Extremities: No leg edema. No discoloration  Neurological: No tremors  Psych:  Affect/mood is normal, judgement is good, memory is intact, grooming is appropriate.    Assessment/Plan:      1. CITLALLI (generalized anxiety disorder)  Chronic problem, not well controlled.  Recommend trial of low-dose SSRI and counseling.  Follow-up 6 to 8 weeks.  - sertraline (ZOLOFT) 25 MG tablet; Take 1 Tablet by mouth every day.  Dispense: 90 Tablet; Refill: 1    2. Moderate episode of recurrent major depressive disorder (HCC)  Not well controlled.  PHQ-9 was 16 in the clinic.  Denies suicidal ideation.  Recommend trial of low-dose SSRI and continue with counseling.  - sertraline (ZOLOFT) 25 MG tablet; Take 1 Tablet by mouth every day.  Dispense: 90 Tablet; Refill: 1  - Patient has been identified as having a positive depression screening. Appropriate orders and counseling have been given.    3. Insomnia, unspecified type  Acute problem.  Suspect multifactorial and suspect that her anxiety and depression are significant contributors it.  However given the fact that she is noted to snore and also have accompanied restless leg we will rule out sleep apnea.  Referral placed.  Trial of low-dose trazodone.  - Referral to Pulmonary and Sleep Medicine  - TSH; Future  - VITAMIN B12; Future  - traZODone (DESYREL) 50 MG Tab; Take 1 Tablet by mouth at bedtime as needed for Sleep.  Dispense: 90 Tablet; Refill: 1    4. Memory impairment  Acute problem.  No recent illnesses, head injury.  Illicit substances.  Recommend checking the following blood work.  I suspect this may be " secondary to her uncontrolled anxiety and depression.  Follow-up 6 to 8 weeks.  - FOLATE; Future  - CBC WITHOUT DIFFERENTIAL; Future  - VITAMIN B1; Future    5. Snoring  - Referral to Pulmonary and Sleep Medicine  - TSH; Future    6. RLS (restless legs syndrome)  New problem.  In the setting of snoring and insomnia recommend checking sleep studies.  We will also check ferritin and B12 levels.  - Referral to Pulmonary and Sleep Medicine  - FERRITIN; Future  - VITAMIN B12; Future    7. Hair loss  New problem, uncertain prognosis.  Recommend checking thyroid, ferritin levels.    - TSH; Future  - FERRITIN; Future  - VITAMIN B12; Future    8. Vitamin D deficiency  - VITAMIN D,25 HYDROXY (DEFICIENCY); Future      Return in about 6 weeks (around 8/17/2023) for Lab results.    {I have placed the above orders and discussed them with an approved delegating provider. The MA is performing the below orders under the direction of Dr. Brooke MERCEDES

## 2023-07-17 ENCOUNTER — TELEPHONE (OUTPATIENT)
Dept: HEALTH INFORMATION MANAGEMENT | Facility: OTHER | Age: 33
End: 2023-07-17
Payer: COMMERCIAL

## 2023-08-18 ENCOUNTER — HOSPITAL ENCOUNTER (OUTPATIENT)
Dept: LAB | Facility: MEDICAL CENTER | Age: 33
End: 2023-08-18
Attending: NURSE PRACTITIONER
Payer: COMMERCIAL

## 2023-08-18 DIAGNOSIS — R06.83 SNORING: ICD-10-CM

## 2023-08-18 DIAGNOSIS — R41.3 MEMORY IMPAIRMENT: ICD-10-CM

## 2023-08-18 DIAGNOSIS — G25.81 RLS (RESTLESS LEGS SYNDROME): ICD-10-CM

## 2023-08-18 DIAGNOSIS — G47.00 INSOMNIA, UNSPECIFIED TYPE: ICD-10-CM

## 2023-08-18 DIAGNOSIS — E55.9 VITAMIN D DEFICIENCY: ICD-10-CM

## 2023-08-18 DIAGNOSIS — L65.9 HAIR LOSS: ICD-10-CM

## 2023-08-18 LAB
25(OH)D3 SERPL-MCNC: 21 NG/ML (ref 30–100)
ERYTHROCYTE [DISTWIDTH] IN BLOOD BY AUTOMATED COUNT: 45.6 FL (ref 35.9–50)
FERRITIN SERPL-MCNC: 99.8 NG/ML (ref 10–291)
FOLATE SERPL-MCNC: 10.1 NG/ML
HCT VFR BLD AUTO: 38.6 % (ref 37–47)
HGB BLD-MCNC: 12.4 G/DL (ref 12–16)
MCH RBC QN AUTO: 28.6 PG (ref 27–33)
MCHC RBC AUTO-ENTMCNC: 32.1 G/DL (ref 32.2–35.5)
MCV RBC AUTO: 88.9 FL (ref 81.4–97.8)
PLATELET # BLD AUTO: 291 K/UL (ref 164–446)
PMV BLD AUTO: 10.8 FL (ref 9–12.9)
RBC # BLD AUTO: 4.34 M/UL (ref 4.2–5.4)
TSH SERPL DL<=0.005 MIU/L-ACNC: 0.94 UIU/ML (ref 0.38–5.33)
VIT B12 SERPL-MCNC: 1730 PG/ML (ref 211–911)
WBC # BLD AUTO: 8.2 K/UL (ref 4.8–10.8)

## 2023-08-18 PROCEDURE — 82728 ASSAY OF FERRITIN: CPT

## 2023-08-18 PROCEDURE — 82746 ASSAY OF FOLIC ACID SERUM: CPT

## 2023-08-18 PROCEDURE — 82607 VITAMIN B-12: CPT

## 2023-08-18 PROCEDURE — 84443 ASSAY THYROID STIM HORMONE: CPT

## 2023-08-18 PROCEDURE — 85027 COMPLETE CBC AUTOMATED: CPT

## 2023-08-18 PROCEDURE — 82306 VITAMIN D 25 HYDROXY: CPT

## 2023-08-18 PROCEDURE — 84425 ASSAY OF VITAMIN B-1: CPT

## 2023-08-18 PROCEDURE — 36415 COLL VENOUS BLD VENIPUNCTURE: CPT

## 2023-08-23 LAB — VIT B1 BLD-MCNC: 148 NMOL/L (ref 70–180)

## 2023-09-27 DIAGNOSIS — R21 RASH AND OTHER NONSPECIFIC SKIN ERUPTION: ICD-10-CM

## 2023-09-28 RX ORDER — HYDROXYZINE HYDROCHLORIDE 25 MG/1
25 TABLET, FILM COATED ORAL 3 TIMES DAILY PRN
Qty: 30 TABLET | Refills: 3 | Status: SHIPPED | OUTPATIENT
Start: 2023-09-28

## 2023-10-03 ENCOUNTER — PATIENT MESSAGE (OUTPATIENT)
Dept: RHEUMATOLOGY | Facility: MEDICAL CENTER | Age: 33
End: 2023-10-03
Payer: COMMERCIAL

## 2023-10-03 DIAGNOSIS — M05.9 SEROPOSITIVE RHEUMATOID ARTHRITIS (HCC): ICD-10-CM

## 2023-10-03 DIAGNOSIS — D84.9 IMMUNOSUPPRESSED STATUS (HCC): ICD-10-CM

## 2023-10-19 ENCOUNTER — HOSPITAL ENCOUNTER (OUTPATIENT)
Dept: LAB | Facility: MEDICAL CENTER | Age: 33
End: 2023-10-19
Attending: STUDENT IN AN ORGANIZED HEALTH CARE EDUCATION/TRAINING PROGRAM
Payer: COMMERCIAL

## 2023-10-19 LAB
ALBUMIN SERPL BCP-MCNC: 4.4 G/DL (ref 3.2–4.9)
ALBUMIN/GLOB SERPL: 1.7 G/DL
ALP SERPL-CCNC: 71 U/L (ref 30–99)
ALT SERPL-CCNC: 15 U/L (ref 2–50)
ANION GAP SERPL CALC-SCNC: 9 MMOL/L (ref 7–16)
AST SERPL-CCNC: 15 U/L (ref 12–45)
BASOPHILS # BLD AUTO: 0.5 % (ref 0–1.8)
BASOPHILS # BLD: 0.04 K/UL (ref 0–0.12)
BILIRUB SERPL-MCNC: 0.4 MG/DL (ref 0.1–1.5)
BUN SERPL-MCNC: 6 MG/DL (ref 8–22)
CALCIUM ALBUM COR SERPL-MCNC: 8.5 MG/DL (ref 8.5–10.5)
CALCIUM SERPL-MCNC: 8.8 MG/DL (ref 8.5–10.5)
CHLORIDE SERPL-SCNC: 107 MMOL/L (ref 96–112)
CO2 SERPL-SCNC: 23 MMOL/L (ref 20–33)
CREAT SERPL-MCNC: 0.59 MG/DL (ref 0.5–1.4)
CRP SERPL HS-MCNC: 0.84 MG/DL (ref 0–0.75)
EOSINOPHIL # BLD AUTO: 0.35 K/UL (ref 0–0.51)
EOSINOPHIL NFR BLD: 4.4 % (ref 0–6.9)
ERYTHROCYTE [DISTWIDTH] IN BLOOD BY AUTOMATED COUNT: 45.3 FL (ref 35.9–50)
ERYTHROCYTE [SEDIMENTATION RATE] IN BLOOD BY WESTERGREN METHOD: 5 MM/HOUR (ref 0–25)
GFR SERPLBLD CREATININE-BSD FMLA CKD-EPI: 123 ML/MIN/1.73 M 2
GLOBULIN SER CALC-MCNC: 2.6 G/DL (ref 1.9–3.5)
GLUCOSE SERPL-MCNC: 97 MG/DL (ref 65–99)
HCT VFR BLD AUTO: 37.3 % (ref 37–47)
HGB BLD-MCNC: 12.1 G/DL (ref 12–16)
IMM GRANULOCYTES # BLD AUTO: 0.02 K/UL (ref 0–0.11)
IMM GRANULOCYTES NFR BLD AUTO: 0.3 % (ref 0–0.9)
LYMPHOCYTES # BLD AUTO: 2.54 K/UL (ref 1–4.8)
LYMPHOCYTES NFR BLD: 32 % (ref 22–41)
MCH RBC QN AUTO: 29 PG (ref 27–33)
MCHC RBC AUTO-ENTMCNC: 32.4 G/DL (ref 32.2–35.5)
MCV RBC AUTO: 89.4 FL (ref 81.4–97.8)
MONOCYTES # BLD AUTO: 0.61 K/UL (ref 0–0.85)
MONOCYTES NFR BLD AUTO: 7.7 % (ref 0–13.4)
NEUTROPHILS # BLD AUTO: 4.37 K/UL (ref 1.82–7.42)
NEUTROPHILS NFR BLD: 55.1 % (ref 44–72)
NRBC # BLD AUTO: 0 K/UL
NRBC BLD-RTO: 0 /100 WBC (ref 0–0.2)
PLATELET # BLD AUTO: 284 K/UL (ref 164–446)
PMV BLD AUTO: 10.8 FL (ref 9–12.9)
POTASSIUM SERPL-SCNC: 4 MMOL/L (ref 3.6–5.5)
PROT SERPL-MCNC: 7 G/DL (ref 6–8.2)
RBC # BLD AUTO: 4.17 M/UL (ref 4.2–5.4)
SODIUM SERPL-SCNC: 139 MMOL/L (ref 135–145)
WBC # BLD AUTO: 7.9 K/UL (ref 4.8–10.8)

## 2023-10-19 PROCEDURE — 85025 COMPLETE CBC W/AUTO DIFF WBC: CPT

## 2023-10-19 PROCEDURE — 86140 C-REACTIVE PROTEIN: CPT

## 2023-10-19 PROCEDURE — 80053 COMPREHEN METABOLIC PANEL: CPT

## 2023-10-19 PROCEDURE — 36415 COLL VENOUS BLD VENIPUNCTURE: CPT

## 2023-10-19 PROCEDURE — 85652 RBC SED RATE AUTOMATED: CPT

## 2023-11-14 ENCOUNTER — APPOINTMENT (OUTPATIENT)
Dept: MEDICAL GROUP | Facility: MEDICAL CENTER | Age: 33
End: 2023-11-14
Payer: COMMERCIAL

## 2023-11-14 PROBLEM — E55.9 VITAMIN D DEFICIENCY: Status: ACTIVE | Noted: 2023-11-14

## 2023-11-15 DIAGNOSIS — F33.1 MODERATE EPISODE OF RECURRENT MAJOR DEPRESSIVE DISORDER (HCC): ICD-10-CM

## 2023-11-15 DIAGNOSIS — F41.1 GAD (GENERALIZED ANXIETY DISORDER): ICD-10-CM

## 2023-11-15 RX ORDER — SERTRALINE HYDROCHLORIDE 25 MG/1
25 TABLET, FILM COATED ORAL DAILY
Qty: 90 TABLET | Refills: 1 | Status: SHIPPED | OUTPATIENT
Start: 2023-11-15 | End: 2023-11-17

## 2023-11-16 ENCOUNTER — OFFICE VISIT (OUTPATIENT)
Dept: RHEUMATOLOGY | Facility: MEDICAL CENTER | Age: 33
End: 2023-11-16
Attending: STUDENT IN AN ORGANIZED HEALTH CARE EDUCATION/TRAINING PROGRAM
Payer: COMMERCIAL

## 2023-11-16 VITALS
HEART RATE: 72 BPM | TEMPERATURE: 98.5 F | WEIGHT: 179 LBS | HEIGHT: 64 IN | OXYGEN SATURATION: 96 % | SYSTOLIC BLOOD PRESSURE: 116 MMHG | DIASTOLIC BLOOD PRESSURE: 68 MMHG | BODY MASS INDEX: 30.56 KG/M2

## 2023-11-16 DIAGNOSIS — D84.9 IMMUNOSUPPRESSED STATUS (HCC): ICD-10-CM

## 2023-11-16 DIAGNOSIS — M05.9 SEROPOSITIVE RHEUMATOID ARTHRITIS (HCC): Chronic | ICD-10-CM

## 2023-11-16 DIAGNOSIS — E55.9 VITAMIN D INSUFFICIENCY: ICD-10-CM

## 2023-11-16 PROBLEM — Z79.899 LONG-TERM USE OF HYDROXYCHLOROQUINE: Status: ACTIVE | Noted: 2023-11-16

## 2023-11-16 PROCEDURE — 99212 OFFICE O/P EST SF 10 MIN: CPT | Performed by: STUDENT IN AN ORGANIZED HEALTH CARE EDUCATION/TRAINING PROGRAM

## 2023-11-16 PROCEDURE — 3074F SYST BP LT 130 MM HG: CPT | Performed by: STUDENT IN AN ORGANIZED HEALTH CARE EDUCATION/TRAINING PROGRAM

## 2023-11-16 PROCEDURE — 3078F DIAST BP <80 MM HG: CPT | Performed by: STUDENT IN AN ORGANIZED HEALTH CARE EDUCATION/TRAINING PROGRAM

## 2023-11-16 PROCEDURE — 99214 OFFICE O/P EST MOD 30 MIN: CPT | Performed by: STUDENT IN AN ORGANIZED HEALTH CARE EDUCATION/TRAINING PROGRAM

## 2023-11-16 RX ORDER — HYDROXYCHLOROQUINE SULFATE 200 MG/1
300 TABLET, FILM COATED ORAL DAILY
Qty: 45 TABLET | Refills: 5 | Status: SHIPPED | OUTPATIENT
Start: 2023-11-16 | End: 2023-11-16

## 2023-11-16 RX ORDER — LEFLUNOMIDE 20 MG/1
20 TABLET ORAL DAILY
Qty: 90 TABLET | Refills: 3 | Status: SHIPPED | OUTPATIENT
Start: 2023-11-16 | End: 2023-11-16 | Stop reason: DRUGHIGH

## 2023-11-16 RX ORDER — NAPROXEN 500 MG/1
500 TABLET ORAL
Qty: 60 TABLET | Refills: 5 | Status: SHIPPED | OUTPATIENT
Start: 2023-11-16

## 2023-11-16 RX ORDER — LEFLUNOMIDE 10 MG/1
10 TABLET ORAL DAILY
Qty: 90 TABLET | Refills: 3 | Status: SHIPPED | OUTPATIENT
Start: 2023-11-16

## 2023-11-16 ASSESSMENT — RHEUMATOLOGY FOLLOW-UP QUESTIONNAIRE
DIFFICULTY SWALLOWING: Y
NOSEBLEEDS: Y
DEPRESSION: Y
IRREGULAR HEARTBEATS: Y
SNORING: Y
HAIR SHEDDING: Y
VERTIGO: Y
BODY ACHES: Y
JOINT PAIN: WORSE WITH ACTIVITY
SPASMS: Y
DIZZINESS: Y
CHIEF_COMPLAINT: FOLLOW UP.
NAUSEA: Y
ANXIETY: Y
NUMBNESS: Y
DURATION: 30-60 MINS
TINGLING: Y
WEAKNESS: Y
MUSCLE PAIN: Y
INSOMNIA: Y
CHARACTERISTIC: SAME WITH ACTIVITY
HEADACHES: Y
UNINTENTIONAL WEIGHT LOSS: >20 LBS

## 2023-11-16 ASSESSMENT — FIBROSIS 4 INDEX: FIB4 SCORE: 0.42

## 2023-11-16 NOTE — PROGRESS NOTES
[facility] RHEUMATOLOGY  [address], [address], [place] [address]  Phone: [phone] ? Fax: [phone]  Website: [facility]/Health-Services/Rheumatology    FOLLOW-UP VISIT NOTE      DATE OF SERVICE: 11/16/2023         Subjective     PRIMARY CARE PRACTITIONER:  MAGO Sin  [address]  [place] [address]    PATIENT IDENTIFICATION:  Madhavi [address] [place] [address]    YOB: 1990    MEDICAL RECORD NUMBER: [id number]          CHIEF COMPLAINT:   Chief Complaint   Patient presents with    Follow-Up     Seropositive rheumatoid arthritis (HCC)       RHEUMATOLOGIC HISTORY:  Madhavi Martinez is a 33 y.o. female with pertinent history notable for seropositive rheumatoid arthritis diagnosed in 4/2022 (positive anti-CCP with negative RF), and dermatographia with urticaria among other comorbidities. She initially presented on 4/28/2022 for evaluation of polyarthralgia in the setting of positive anti-CCP. Reported onset of symptoms in 12/2021 with joint pain (up to 8-9/10 severity) in her hands (mostly PIP joints) with swelling, wrists, elbows, shoulders, lower back/hips, knees, ankles, and feet. 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 She was treated with a course of prednisone taper (helpful) and tramadol (made her very tired), but she had been managing mostly with Celebrex and applying heat compresses on the joints and body. 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 Reported multiple associated symptoms including muscle aches, weakness, chronic fatigue, insomnia, headaches, nausea, chest pain with breathing, and anxiety/depression.    Pertinent treatments: Prednisone  20 mg taper (4/2022-5/2022, helpful but caused GI and mood issues), methotrexate 15 mg oral weekly (4/2022-8/2022, partially effective and caused severe stomach upset), sulfasalazine 500 mg twice daily (8/2022-12/2022, ineffective), leflunomide 100 mg x3 days then 20>20 mg daily (ordered 12/16/22, effective but caused hair loss and weight loss, dose decreased 11/16/23-present).    Pertinent positive labs: Positive anti-CCP 32 with negative RF (in 1/2022); low vitamin D of 21 (in 8/2023); elevated CRP 0.84<1.03 with normal ESR 5<11 (in 10/2023<2/2023).    Pertinent negative labs: Negative DENISSE (in 1/2022) and HLA-B27 (in 8/2022), TSH (in 8/2023), eGFR, creatinine, LFTs, and CBC (in 10/2023).    Pertinent XR imaging: Hands, feet, and SI joints (in 3/2022) with no evidence of inflammatory arthropathy.      INTERVAL HISTORY:  Reports interval history as noted on the questionnaire below or scanned under media tab.  [facility] Established Patient History Form    11/16/2023  8:28 AM PST - Filed by Patient   MAIN REASON FOR VISIT Follow up.   INTERVAL HISTORY OF ILLNESS   Date of worsening onset:    Preceding incident/ailment:    Describe/list your symptoms: Increasing joint pain/stiffness in wrists and lower back since last dose of leflunomide a month ago   Exacerbating factors:    Alleviating factors:    Helpful medications:    Ineffective medications:    Severity of pain (scale of 1-10):    Personal/emotional stressors:    Andriy All The Areas Of Pain    REVIEW OF SYMPTOMS    General   Fevers    Chills    Night sweats    Malaise    Fatigue    Unintentional weight loss >20 lbs   Musculoskeletal   Joint pain Worse with activity   Morning stiffness duration 30-60 mins   Morning stiffness characteristic Same with activity   Joint swelling    Joint instability    Tendon pain    Muscle pain Yes   Body aches Yes   Dermatologic   Hair loss with bald spots    Hair shedding Yes   Skin thickening    Skin plaques     Sunlight-induced skin rash    Cold-induced color changes (white, purple, red on rewarming)    Neurologic/Psychiatric   Weakness Yes   Spasms Yes   Tingling Yes   Burning    Numbness Yes   Insomnia Yes   Anxiety Yes   Depression Yes   Head/Eyes   Headaches Yes   Temple pain    Dizziness Yes   Dry eyes    Eye pain    Eye redness    Blurry vision    Vision loss    Ears/Nose   Ear pain    Ringing in ears    Vertigo Yes   Hearing loss    Nasal ulcers    Nosebleeds Yes   Sinus pain    Nasal congestion    Snoring Yes   Mouth/Throat   Oral ulcers    Bleeding gums    Dry mouth    Cavities    Sore throat    Sticking in throat    Difficulty speaking    Neck/Lymphatics   Thyroid pain    Thyroid swelling    Lymph node swelling    Cardiac/Respiratory   Chest pain with breathing    Dry cough    Cough with bloody phlegm    Shortness of breath    Fast heartbeats    Irregular heartbeats Yes   Gastrointestinal   Nausea Yes   Vomiting    Difficulty swallowing Yes   Heartburn    Abdominal pain    Bloody stool    Mucus stool    Genitourinary   Pelvic pain    Genital ulcers    Abnormal discharge    Burning urination    Frothy urine    Blood in urine        REVIEW OF SYSTEMS:  Except as noted in the history above, relevant review of systems with emphasis on autoimmune rheumatic diseases was otherwise negative.      ACTIVE PROBLEM LIST:  Patient Active Problem List    Diagnosis Date Noted    Vitamin D insufficiency 11/14/2023    Major depressive disorder 07/06/2023    Adjustment disorder 02/23/2023    Anxiety 02/02/2023    Central sensitization to pain 08/26/2022    Mood swings 08/04/2022    Pain 06/02/2022    Seropositive rheumatoid arthritis (HCC) 04/28/2022    Immunosuppressed status (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 No pertinent surgical history.    MEDICATIONS:  Current Outpatient Medications   Medication Sig    naproxen (NAPROSYN) 500 MG Tab Take 1 Tablet by mouth 2 times daily with meals as needed (for arthritis).    leflunomide (ARAVA) 10 MG Tab Take 1 Tablet by mouth every day.    sertraline (ZOLOFT) 25 MG tablet TAKE 1 TABLET BY MOUTH EVERY DAY    hydrOXYzine HCl (ATARAX) 25 MG Tab Take 1 Tablet by mouth 3 times a day as needed for Itching.    traZODone (DESYREL) 50 MG Tab Take 1 Tablet by mouth at bedtime as needed for Sleep.    folic acid (FOLVITE) 1 MG Tab Take 1 mg by mouth every day.    gabapentin (NEURONTIN) 300 MG Cap Take 1 Capsule by mouth 3 times a day. (Patient not taking: Reported on 11/16/2023)       ALLERGIES:   No Known Allergies    IMMUNIZATIONS:  Immunization History   Administered Date(s) Administered    MODERNA SARS-COV-2 VACCINE (12+) 03/26/2021, 04/23/2021, 12/04/2021    Pneumococcal Conjugate Vaccine (PCV20) 04/14/2022    Tdap Vaccine 11/04/2022       SOCIAL HISTORY:   Social History     Socioeconomic History    Marital status:     Highest education level: GED or equivalent   Tobacco Use    Smoking status: Never    Smokeless tobacco: Never   Vaping Use    Vaping Use: Never used   Substance and Sexual Activity    Alcohol use: Yes     Alcohol/week: 1.2 oz     Types: 2 Standard drinks or equivalent per week    Drug use: Yes     Types: Marijuana, Oral    Sexual activity: Yes     Partners: Male     Birth control/protection: I.U.D.     Comment: Mirena     Social Determinants of Health     Financial Resource Strain: Low Risk  (2/23/2023)    Overall Financial Resource Strain (CARDIA)     Difficulty of Paying Living Expenses: Not very hard   Food Insecurity: No Food Insecurity (2/23/2023)    Hunger Vital Sign     Worried About Running Out of Food in  "the Last Year: Never true     Ran Out of Food in the Last Year: Never true   Transportation Needs: No Transportation Needs (2/23/2023)    PRAPARE - Transportation     Lack of Transportation (Medical): No     Lack of Transportation (Non-Medical): No   Physical Activity: Insufficiently Active (2/23/2023)    Exercise Vital Sign     Days of Exercise per Week: 3 days     Minutes of Exercise per Session: 30 min   Stress: Stress Concern Present (2/23/2023)    [employer] [employer] [employer] - Occupational Stress Questionnaire     Feeling of Stress : Very much   Social Connections: Unknown (2/23/2023)    Social Connection and Isolation Panel [NHANES]     Frequency of Communication with Friends and Family: More than three times a week     Frequency of Social Gatherings with Friends and Family: Once a week     Attends Jewish Services: Never     Active Member of Clubs or Organizations: Patient refused     Attends Club or Organization Meetings: Patient refused     Marital Status:    Housing Stability: Low Risk  (2/23/2023)    Housing Stability Vital Sign     Unable to Pay for Housing in the Last Year: No     Number of Places Lived in the Last Year: 1     Unstable Housing in the Last Year: No       FAMILY HISTORY:  Family History   Problem Relation Age of Onset    Cancer Maternal Grandfather         lymphoma    Cancer Paternal Grandmother         colon cancer    No Known Problems Paternal Grandfather             Objective     Vital Signs: /68 (BP Location: Left arm, Patient Position: Sitting, BP Cuff Size: Adult)   Pulse 72   Temp 36.9 °C (98.5 °F) (Temporal)   Ht 1.626 m (5' 4\")   Wt 81.2 kg (179 lb)   SpO2 96% Body mass index is 30.73 kg/m².    General: Appears well and comfortable  Eyes: No scleral or conjunctival lesions  ENT: No apparent oral or nasal lesions  Head/Neck: No apparent scalp or neck lesions  Cardiovascular: Regular rate and rhythm  Respiratory: Breathing quiet and " unlabored  Gastrointestinal: No apparent organomegaly or abdominal masses  Integumentary: No significant cutaneous lesions or dyspigmentation  Musculoskeletal: Mild tenderness of wrists (right more than left) and lower lumbar spine (with mild restriction in forward flexion); no periarticular soft tissue swelling, warmth, erythema, or overt synovitis; no significant restriction in range of motion of peripheral joints examined  Neurologic: No focal sensory or motor deficits  Psychiatric: Mood and affect appropriate      LABORATORY RESULTS REVIEWED AND INTERPRETED BY ME:  Lab Results   Component Value Date/Time    CREACTPROT 0.84 (H) 10/19/2023 08:57 AM    SEDRATEWES 5 10/19/2023 08:57 AM     Lab Results   Component Value Date/Time    RHEUMFACTN <10 01/22/2022 07:20 AM    CCPANTIBODY 32 (H) 01/22/2022 07:20 AM    CMJU08NAEF Negative 08/30/2022 09:50 AM     Lab Results   Component Value Date/Time    ANTINUCAB None Detected 01/22/2022 07:20 AM     Lab Results   Component Value Date/Time    TSHULTRASEN 0.940 08/18/2023 07:51 AM     Lab Results   Component Value Date/Time    25HYDROXY 21 (L) 08/18/2023 07:51 AM     Lab Results   Component Value Date/Time    FERRITIN 99.8 08/18/2023 07:51 AM    FOLATE 10.1 08/18/2023 07:51 AM     Lab Results   Component Value Date/Time    WBC 7.9 10/19/2023 08:57 AM    RBC 4.17 (L) 10/19/2023 08:57 AM    HEMOGLOBIN 12.1 10/19/2023 08:57 AM    HEMATOCRIT 37.3 10/19/2023 08:57 AM    MCV 89.4 10/19/2023 08:57 AM    MCH 29.0 10/19/2023 08:57 AM    MCHC 32.4 10/19/2023 08:57 AM    RDW 45.3 10/19/2023 08:57 AM    PLATELETCT 284 10/19/2023 08:57 AM    MPV 10.8 10/19/2023 08:57 AM    NEUTS 4.37 10/19/2023 08:57 AM    LYMPHOCYTES 32.00 10/19/2023 08:57 AM    MONOCYTES 7.70 10/19/2023 08:57 AM    EOSINOPHILS 4.40 10/19/2023 08:57 AM    BASOPHILS 0.50 10/19/2023 08:57 AM     Lab Results   Component Value Date/Time    ASTSGOT 15 10/19/2023 08:57 AM    ALTSGPT 15 10/19/2023 08:57 AM    ALKPHOSPHAT 71  10/19/2023 08:57 AM    TBILIRUBIN 0.4 10/19/2023 08:57 AM    TOTPROTEIN 7.0 10/19/2023 08:57 AM    ALBUMIN 4.4 10/19/2023 08:57 AM     Lab Results   Component Value Date/Time    SODIUM 139 10/19/2023 08:57 AM    POTASSIUM 4.0 10/19/2023 08:57 AM    CHLORIDE 107 10/19/2023 08:57 AM    CO2 23 10/19/2023 08:57 AM    GLUCOSE 97 10/19/2023 08:57 AM    BUN 6 (L) 10/19/2023 08:57 AM    CREATININE 0.59 10/19/2023 08:57 AM    CALCIUM 8.8 10/19/2023 08:57 AM     Lab Results   Component Value Date/Time    COLORURINE Yellow 02/15/2023 12:21 PM    SPECGRAVITY 1.025 02/15/2023 12:21 PM    PHURINE 6.0 02/15/2023 12:21 PM    GLUCOSEUR Negative 02/15/2023 12:21 PM    KETONES Trace (A) 02/15/2023 12:21 PM    PROTEINURIN Negative 02/15/2023 12:21 PM     Lab Results   Component Value Date/Time    HEPCAB Non-Reactive 05/05/2023 08:22 AM     Lab Results   Component Value Date/Time    CHOLSTRLTOT 186 01/22/2022 07:20 AM     (H) 01/22/2022 07:20 AM    HDL 52 01/22/2022 07:20 AM    TRIGLYCERIDE 148 01/22/2022 07:20 AM    HBA1C 5.1 05/05/2022 07:40 AM       RADIOLOGY RESULTS REVIEWED AND INTERPRETED BY ME:  Results for orders placed during the hospital encounter of 03/26/22    DX-JOINT SURVEY-FEET SINGLE VIEW    Impression  1.  No evidence of erosive arthropathy.  2.  Calcific density adjacent to the distal fibula on the left is likely related to prior injury.    Results for orders placed during the hospital encounter of 03/26/22    DX-JOINT SURVEY-HANDS SINGLE VIEW    Impression  No evidence of erosive arthropathy.    Results for orders placed during the hospital encounter of 03/26/22    DX-SACROILIAC JOINTS 3+    Impression  1.  No evidence of erosive arthropathy of the sacroiliac joints bilaterally.  2.  IUD in the pelvis.      All relevant laboratory and imaging results reported on this note were reviewed and interpreted by me.         Assessment & Plan     Madhavi Martinez is a 33 y.o. female with history as noted above whose  presentation merits the following clinical impressions and recommendations:    1. Seropositive rheumatoid arthritis (HCC)  Clinically appears to have responded well to leflunomide and now with increasing symptoms since last dose a month ago. Given her reported significant hair loss and weight loss as well as lower back pain which are all potential side effects of leflunomide, would decrease the dose by 50% as noted below. Would avoid hydroxychloroquine due to potential interaction with sertraline that could result in elevated QTc interval which would put her at risk of arrhythmia.  - RHEUMATOID ARTHRITIS PANEL (RF, anti-CCP, and anti-CarP); Future  - CRP QUANTITIVE (NON-CARDIAC); Future  - Sed Rate; Future  - leflunomide (ARAVA) 10 MG Tab; Take 1 Tablet by mouth every day.  Dispense: 90 Tablet; Refill: 3  - naproxen (NAPROSYN) 500 MG Tab; Take 1 Tablet by mouth 2 times daily with meals as needed (for arthritis).  Dispense: 60 Tablet; Refill: 5    2. Vitamin D insufficiency  Hypovitaminosis D can contribute to diffuse musculoskeletal aches, fatigue, and malaise, so need to supplement and occasionally reassess its level.  - VITAMIN D,25 HYDROXY (DEFICIENCY); Future  - Recommend vitamin D 2281-4823 IU daily    3. Immunosuppressed status (HCC)  Presently with no history, physical, or laboratory evidence to suggest significant adverse drug effects or opportunistic infections, but need routine monitoring per guidelines.  - CBC WITH DIFFERENTIAL; Future  - Comp Metabolic Panel; Future  - Need to ascertain age-appropriate vaccines in addition to the ones listed above under immunizations      The above assessment and plan were discussed with the patient who acknowledged understanding of the plan.    FOLLOW-UP: Return in about 3 months (around 2/16/2024) for Short.         Thank you for the opportunity to participate in the care of Madhavi Martinez.    Gentry Beckman MD, MS, FACR  Rheumatologist, [facility] Rheumatology ?  [facility]   of Clinical Medicine, Department of Internal Medicine  [facility], [employer] [facility]

## 2023-11-16 NOTE — PATIENT INSTRUCTIONS
AFTER VISIT INSTRUCTIONS    Below are important information to help you navigate your healthcare needs and help us serve you safely and effectively:  If laboratory tests and/or imaging studies were ordered, remember to go get them done as instructed.  If new prescriptions and/or refills were sent, remember to go pick them up from your local pharmacy, or call the specialty pharmacy to request shipment.  Always take your prescription medications exactly as prescribed unless instructed otherwise.  Note that antirheumatic drugs and steroids are immunosuppressive which means they increase your risk of infections and have multiple potential adverse effects on various organ systems in your body, though most of them are uncommon.  It is important that you are up-to-date on age-appropriate immunizations, particularly shingles and bacterial/viral pneumonia vaccines, which you can request from me or your primary care provider.  Be sure to read the drug package inserts to learn about the potential side effects of your medications before you start taking them.  If you experience any significant drug side effects, stop taking the medication and notify me promptly, and depending on the severity of the side effects, consider going to an urgent care or emergency department for immediate attention.  If there are significant findings on your lab tests and imaging studies that warrant further action, I will notify you with explanations via [employer] or phone call, otherwise you can view them on [facility] and let me know if you have any questions.  Note that [facility] messages are typically read during office hours and may take 1-7 business days before a response depending on the urgency of the situation and how busy my clinic schedule is.  In general, [facility] messaging is for non-urgent matters that do not require immediate attention, so for urgent matters that cannot wait, you are advised to go to an urgent care.  Lastly, you are granted  [employer] access to my documentation of your visit and are encouraged to read my note which details my assessment and plan for your condition.  To learn more about your condition and rheumatic diseases evaluated and treated by rheumatologists, as well as gain access to many helpful resources about these diseases, visit our website at [email or web address].

## 2023-11-17 ENCOUNTER — TELEMEDICINE (OUTPATIENT)
Dept: MEDICAL GROUP | Facility: MEDICAL CENTER | Age: 33
End: 2023-11-17
Payer: COMMERCIAL

## 2023-11-17 VITALS — BODY MASS INDEX: 29.87 KG/M2 | WEIGHT: 174 LBS

## 2023-11-17 DIAGNOSIS — D64.9 ANEMIA, UNSPECIFIED TYPE: ICD-10-CM

## 2023-11-17 DIAGNOSIS — F33.1 MODERATE EPISODE OF RECURRENT MAJOR DEPRESSIVE DISORDER (HCC): ICD-10-CM

## 2023-11-17 DIAGNOSIS — F41.1 GAD (GENERALIZED ANXIETY DISORDER): ICD-10-CM

## 2023-11-17 DIAGNOSIS — R63.4 WEIGHT LOSS: ICD-10-CM

## 2023-11-17 DIAGNOSIS — Z11.4 SCREENING FOR HIV (HUMAN IMMUNODEFICIENCY VIRUS): ICD-10-CM

## 2023-11-17 PROCEDURE — 99214 OFFICE O/P EST MOD 30 MIN: CPT | Mod: 95 | Performed by: NURSE PRACTITIONER

## 2023-11-17 ASSESSMENT — FIBROSIS 4 INDEX: FIB4 SCORE: 0.42

## 2023-11-17 NOTE — PROGRESS NOTES
"Virtual Visit: Established Patient   This visit was conducted via Zoom using secure and encrypted videoconferencing technology.   The patient was in their home in the state of [place].    The patient's identity was confirmed and verbal consent was obtained for this virtual visit.    Subjective:     Madhavi Martinez is a 33 y.o. female presenting for evaluation and management of:    Has noticed losing weight-unintentionally in the last few months.     She is at 174lb at home.   She feels as if she has lost muscle mass. Feeling more loose skin-not as muscular as before.   Wt Readings from Last 4 Encounters:   12/21/23 81.4 kg (179 lb 7.3 oz)   11/17/23 78.9 kg (174 lb)   11/16/23 81.2 kg (179 lb)   07/06/23 85.7 kg (189 lb)       She states 'no desire to eat'  Feeling dizzy at times, especially when on a ladder.     She states she has been lashing out more.   Maybe not sleeping very well.   Having \"weekly breakdowns.  Has slight nausea at times.   She has a family hx of cancer-this gives her some concern given her multiple symptoms.     Her thyroid levels were WNL in August  No enlarged lymph nodes on her body. Has a cold now  She is having some PVC's      Followed by Rheumatology  Has been off leflunoamind for six week, now on lower dose      Current medicines (including changes today)  Current Outpatient Medications   Medication Sig Dispense Refill    sertraline (ZOLOFT) 50 MG Tab Take 1 Tablet by mouth every day. 90 Tablet 1    baclofen (LIORESAL) 10 MG Tab Take 0.5-1 Tablets by mouth 2 times a day. 60 Tablet 1    naproxen (NAPROSYN) 500 MG Tab Take 1 Tablet by mouth 2 times daily with meals as needed (for arthritis). 60 Tablet 5    leflunomide (ARAVA) 10 MG Tab Take 1 Tablet by mouth every day. 90 Tablet 3    hydrOXYzine HCl (ATARAX) 25 MG Tab Take 1 Tablet by mouth 3 times a day as needed for Itching. 30 Tablet 3    traZODone (DESYREL) 50 MG Tab Take 1 Tablet by mouth at bedtime as needed for Sleep. 90 Tablet 1    " folic acid (FOLVITE) 1 MG Tab Take 1 mg by mouth every day.       No current facility-administered medications for this visit.       Patient Active Problem List    Diagnosis Date Noted    Vitamin D insufficiency 11/14/2023    Major depressive disorder 07/06/2023    Adjustment disorder 02/23/2023    Anxiety 02/02/2023    Central sensitization to pain 08/26/2022    Mood swings 08/04/2022    Pain 06/02/2022    Seropositive rheumatoid arthritis (HCC) 04/28/2022    Immunosuppressed status (HCC) 04/28/2022    Arthritis 04/14/2022    Dermatographic urticaria 04/14/2022    Family history of leukemia 04/14/2022    Unspecified skin changes 01/13/2022    Obesity (BMI 30-39.9) 01/13/2022        Objective:   Wt 78.9 kg (174 lb)   BMI 29.87 kg/m²     Physical Exam:  Constitutional: Alert, no distress, well-groomed.  Skin: No rashes in visible areas.  Eye: Round. Conjunctiva clear, lids normal. No icterus.   ENMT: Lips pink without lesions, good dentition, moist mucous membranes. Phonation normal.  Neck: No masses, no thyromegaly. Moves freely without pain.  Respiratory: Unlabored respiratory effort, no cough or audible wheeze  Psych: Alert and oriented x3, normal affect and mood.     Assessment and Plan:   The following treatment plan was discussed:     1. Weight loss   New problem-uncertain etiology.   Have discussed possible differentials  Recent TSH was WNL  We will recheck her TSH levels.   Consider depression -  - TSH; Future  - CBC WITH DIFFERENTIAL; Future  - T3 FREE; Future  - FREE THYROXINE; Future  - FERRITIN; Future  - IRON/TOTAL IRON BIND; Future    2. Anemia, unspecified type  Previously present  We have dicussed updating labs.  - CBC WITH DIFFERENTIAL; Future  - FERRITIN; Future  - IRON/TOTAL IRON BIND; Future    3. Moderate episode of recurrent major depressive disorder (HCC)  Not well controlled.   Recommend increased Sertraline to 50mg.   Recommend ongoing counseling.   - sertraline (ZOLOFT) 50 MG Tab; Take 1  Tablet by mouth every day.  Dispense: 90 Tablet; Refill: 1    4. CITLALLI (generalized anxiety disorder)  Chronic. Recommend increasing Sertraline to 50mg daily.   - sertraline (ZOLOFT) 50 MG Tab; Take 1 Tablet by mouth every day.  Dispense: 90 Tablet; Refill: 1    5. Screening for HIV (human immunodeficiency virus)  - HIV AG/AB COMBO ASSAY SCREENING; Future      Follow-up: Return in about 4 weeks (around 12/15/2023) for Lab results.

## 2023-12-07 ENCOUNTER — APPOINTMENT (OUTPATIENT)
Dept: MEDICAL GROUP | Facility: MEDICAL CENTER | Age: 33
End: 2023-12-07
Payer: COMMERCIAL

## 2023-12-15 ENCOUNTER — HOSPITAL ENCOUNTER (OUTPATIENT)
Dept: LAB | Facility: MEDICAL CENTER | Age: 33
End: 2023-12-15
Attending: NURSE PRACTITIONER
Payer: COMMERCIAL

## 2023-12-15 DIAGNOSIS — R63.4 WEIGHT LOSS: ICD-10-CM

## 2023-12-15 DIAGNOSIS — D64.9 ANEMIA, UNSPECIFIED TYPE: ICD-10-CM

## 2023-12-15 DIAGNOSIS — Z11.4 SCREENING FOR HIV (HUMAN IMMUNODEFICIENCY VIRUS): ICD-10-CM

## 2023-12-15 LAB
FERRITIN SERPL-MCNC: 75.8 NG/ML (ref 10–291)
HIV 1+2 AB+HIV1 P24 AG SERPL QL IA: NORMAL
IRON SATN MFR SERPL: 34 % (ref 15–55)
IRON SERPL-MCNC: 103 UG/DL (ref 40–170)
T3FREE SERPL-MCNC: 3.15 PG/ML (ref 2–4.4)
T4 FREE SERPL-MCNC: 1.34 NG/DL (ref 0.93–1.7)
TIBC SERPL-MCNC: 301 UG/DL (ref 250–450)
TSH SERPL DL<=0.005 MIU/L-ACNC: 0.62 UIU/ML (ref 0.38–5.33)
UIBC SERPL-MCNC: 198 UG/DL (ref 110–370)

## 2023-12-15 PROCEDURE — 36415 COLL VENOUS BLD VENIPUNCTURE: CPT

## 2023-12-15 PROCEDURE — 84439 ASSAY OF FREE THYROXINE: CPT

## 2023-12-15 PROCEDURE — 83550 IRON BINDING TEST: CPT

## 2023-12-15 PROCEDURE — 82728 ASSAY OF FERRITIN: CPT

## 2023-12-15 PROCEDURE — 87389 HIV-1 AG W/HIV-1&-2 AB AG IA: CPT

## 2023-12-15 PROCEDURE — 84481 FREE ASSAY (FT-3): CPT

## 2023-12-15 PROCEDURE — 85025 COMPLETE CBC W/AUTO DIFF WBC: CPT

## 2023-12-15 PROCEDURE — 83540 ASSAY OF IRON: CPT

## 2023-12-15 PROCEDURE — 84443 ASSAY THYROID STIM HORMONE: CPT

## 2023-12-16 LAB
BASOPHILS # BLD AUTO: 0.4 % (ref 0–1.8)
BASOPHILS # BLD: 0.03 K/UL (ref 0–0.12)
EOSINOPHIL # BLD AUTO: 0.25 K/UL (ref 0–0.51)
EOSINOPHIL NFR BLD: 3.4 % (ref 0–6.9)
ERYTHROCYTE [DISTWIDTH] IN BLOOD BY AUTOMATED COUNT: 46.5 FL (ref 35.9–50)
HCT VFR BLD AUTO: 38.7 % (ref 37–47)
HGB BLD-MCNC: 12.5 G/DL (ref 12–16)
IMM GRANULOCYTES # BLD AUTO: 0.02 K/UL (ref 0–0.11)
IMM GRANULOCYTES NFR BLD AUTO: 0.3 % (ref 0–0.9)
LYMPHOCYTES # BLD AUTO: 2.63 K/UL (ref 1–4.8)
LYMPHOCYTES NFR BLD: 35.3 % (ref 22–41)
MCH RBC QN AUTO: 29.3 PG (ref 27–33)
MCHC RBC AUTO-ENTMCNC: 32.3 G/DL (ref 32.2–35.5)
MCV RBC AUTO: 90.8 FL (ref 81.4–97.8)
MONOCYTES # BLD AUTO: 0.51 K/UL (ref 0–0.85)
MONOCYTES NFR BLD AUTO: 6.8 % (ref 0–13.4)
NEUTROPHILS # BLD AUTO: 4.02 K/UL (ref 1.82–7.42)
NEUTROPHILS NFR BLD: 53.8 % (ref 44–72)
NRBC # BLD AUTO: 0 K/UL
NRBC BLD-RTO: 0 /100 WBC (ref 0–0.2)
PLATELET # BLD AUTO: 272 K/UL (ref 164–446)
PMV BLD AUTO: 11.2 FL (ref 9–12.9)
RBC # BLD AUTO: 4.26 M/UL (ref 4.2–5.4)
WBC # BLD AUTO: 7.5 K/UL (ref 4.8–10.8)

## 2023-12-21 ENCOUNTER — HOSPITAL ENCOUNTER (OUTPATIENT)
Dept: RADIOLOGY | Facility: MEDICAL CENTER | Age: 33
End: 2023-12-21
Attending: NURSE PRACTITIONER
Payer: COMMERCIAL

## 2023-12-21 ENCOUNTER — OFFICE VISIT (OUTPATIENT)
Dept: MEDICAL GROUP | Facility: MEDICAL CENTER | Age: 33
End: 2023-12-21
Payer: COMMERCIAL

## 2023-12-21 VITALS
HEART RATE: 67 BPM | BODY MASS INDEX: 30.64 KG/M2 | WEIGHT: 179.45 LBS | TEMPERATURE: 97 F | DIASTOLIC BLOOD PRESSURE: 54 MMHG | OXYGEN SATURATION: 96 % | HEIGHT: 64 IN | SYSTOLIC BLOOD PRESSURE: 98 MMHG

## 2023-12-21 DIAGNOSIS — R22.1 NECK FULLNESS: ICD-10-CM

## 2023-12-21 DIAGNOSIS — M25.519 NECK AND SHOULDER PAIN: ICD-10-CM

## 2023-12-21 DIAGNOSIS — M62.81 SUBJECTIVE MUSCLE WEAKNESS: ICD-10-CM

## 2023-12-21 DIAGNOSIS — R51.9 NONINTRACTABLE HEADACHE, UNSPECIFIED CHRONICITY PATTERN, UNSPECIFIED HEADACHE TYPE: ICD-10-CM

## 2023-12-21 DIAGNOSIS — M62.89 MUSCLE TIGHTNESS: ICD-10-CM

## 2023-12-21 DIAGNOSIS — F33.1 MODERATE EPISODE OF RECURRENT MAJOR DEPRESSIVE DISORDER (HCC): ICD-10-CM

## 2023-12-21 DIAGNOSIS — R63.0 DECREASED APPETITE: ICD-10-CM

## 2023-12-21 DIAGNOSIS — M54.2 NECK AND SHOULDER PAIN: ICD-10-CM

## 2023-12-21 DIAGNOSIS — M54.50 LUMBAR PAIN: ICD-10-CM

## 2023-12-21 PROCEDURE — 72040 X-RAY EXAM NECK SPINE 2-3 VW: CPT

## 2023-12-21 PROCEDURE — 99214 OFFICE O/P EST MOD 30 MIN: CPT | Performed by: NURSE PRACTITIONER

## 2023-12-21 PROCEDURE — 3078F DIAST BP <80 MM HG: CPT | Performed by: NURSE PRACTITIONER

## 2023-12-21 PROCEDURE — 3074F SYST BP LT 130 MM HG: CPT | Performed by: NURSE PRACTITIONER

## 2023-12-21 RX ORDER — BACLOFEN 10 MG/1
5-10 TABLET ORAL 2 TIMES DAILY
Qty: 60 TABLET | Refills: 1 | Status: SHIPPED | OUTPATIENT
Start: 2023-12-21

## 2023-12-21 ASSESSMENT — FIBROSIS 4 INDEX: FIB4 SCORE: 0.44

## 2023-12-21 NOTE — PROGRESS NOTES
Chief Complaint   Patient presents with    Headache     Constant, neck pain, losing depth perception, lasting 3 weeks      Subjective:     HPI:     Madhavi Martinez is a 33 y.o. female here to discuss the following:    Labs reviewed.     Patient presents with ongoing headache.  It is a constant pressure that starts in the back of her head and then goes up and over her head.  Feels pressure behind her left eye.  Having them at least weekly.  However now she is having with light sensitivity.  Driving in the dark is become quite difficult for her.  She does endorse nausea.    She also reports anterior lateral neck tenderness that started this week.  Does not feel like it is a impending upper respiratory infection.  She does have tenderness with palpation.  Left side greater than right side.  Slight fullness appreciated on exam.      She tells me that has been feeling a bit better mentally since last office visit.  Compliant with sertraline.    She continues to report subjective muscle weakness.  Feeling as if she has loss of muscle mass.  This does cause her concern.  Reporting having a difficult time climbing stairs and feeling as if her upper extremities are weaker and not able to perform as a previously had especially in her job.    She also reports having lumbar back pain.  Describes me like a tight band around her lower back.  It is somewhat of a tight/pressure-like feeling.    Continues to report decreased appetite.      ROS: : see above        Current Outpatient Medications:     baclofen (LIORESAL) 10 MG Tab, Take 0.5-1 Tablets by mouth 2 times a day., Disp: 60 Tablet, Rfl: 1    sertraline (ZOLOFT) 50 MG Tab, Take 1 Tablet by mouth every day., Disp: 90 Tablet, Rfl: 1    naproxen (NAPROSYN) 500 MG Tab, Take 1 Tablet by mouth 2 times daily with meals as needed (for arthritis)., Disp: 60 Tablet, Rfl: 5    leflunomide (ARAVA) 10 MG Tab, Take 1 Tablet by mouth every day., Disp: 90 Tablet, Rfl: 3    hydrOXYzine  "HCl (ATARAX) 25 MG Tab, Take 1 Tablet by mouth 3 times a day as needed for Itching., Disp: 30 Tablet, Rfl: 3    traZODone (DESYREL) 50 MG Tab, Take 1 Tablet by mouth at bedtime as needed for Sleep., Disp: 90 Tablet, Rfl: 1    folic acid (FOLVITE) 1 MG Tab, Take 1 mg by mouth every day., Disp: , Rfl:     No Known Allergies    Objective:     Vitals: BP 98/54 (BP Location: Right arm, Patient Position: Sitting, BP Cuff Size: Adult)   Pulse 67   Temp 36.1 °C (97 °F) (Temporal)   Ht 1.626 m (5' 4\")   Wt 81.4 kg (179 lb 7.3 oz)   SpO2 96%   BMI 30.80 kg/m²    General: Alert, pleasant, NAD  HEENT: Normocephalic.  Neck supple.   Respiratory: no distress, no audible wheezing, RR -WNL  Skin: Warm, dry, no rashes.  Extremities: No leg edema. No discoloration  Neurological: No tremors  Psych:  Affect/mood is normal, judgement is good, memory is intact, grooming is appropriate.    Assessment/Plan:      1. Nonintractable headache, unspecified chronicity pattern, unspecified headache type  Ongoing.  Now with light sensitivity, nausea.  Have discussed trial of muscle relaxer given her neck and shoulder tightness and tension.  Caution regarding drowsiness.  We have also discussed referral to neurology given the accompanied undiagnosed muscle weakness.  - Referral to Neurology    2. Neck and shoulder pain  Ongoing.  Recommend plain film for baseline.  Recommend trial of baclofen, physical therapy heat, massage.  Suspect this is also contributing to her headaches.  - DX-CERVICAL SPINE-2 OR 3 VIEWS; Future  - Referral to Physical Therapy  - baclofen (LIORESAL) 10 MG Tab; Take 0.5-1 Tablets by mouth 2 times a day.  Dispense: 60 Tablet; Refill: 1    3. Lumbar pain  Ongoing.  Recommend physical therapy, trial of baclofen.  Recommend stretching, exercise daily.  - Referral to Physical Therapy  - baclofen (LIORESAL) 10 MG Tab; Take 0.5-1 Tablets by mouth 2 times a day.  Dispense: 60 Tablet; Refill: 1    4. Subjective muscle " weakness  Ongoing, uncertain diagnosis at this time.  Have discussed possible differentials-at this time we have discussed further evaluation with neurology.  - Referral to Neurology    5. Muscle tightness  - DX-CERVICAL SPINE-2 OR 3 VIEWS; Future  - Referral to Physical Therapy  - baclofen (LIORESAL) 10 MG Tab; Take 0.5-1 Tablets by mouth 2 times a day.  Dispense: 60 Tablet; Refill: 1    6. Neck fullness  Acute tenderness to her anterior lateral neck over the last week.  Uncertain etiology at this time.  Clinically she does have some tenderness with palpation and slight fullness noted.    We discussed ordering an ultrasound for further evaluation.   - US-SOFT TISSUES OF HEAD - NECK; Future    7. Decreased appetite  Ongoing, unknown certain etiology at this time.  Weight appears stable.    8. Moderate episode of recurrent major depressive disorder (HCC)  Chronic.  Improved with the increase in sertraline.  Continue sertraline 50 mg.      Return in about 3 months (around 3/21/2024).    {I have placed the above orders and discussed them with an approved delegating provider. The MA is performing the below orders under the direction of Dr. Antoni MERCEDES

## 2024-01-11 ENCOUNTER — HOSPITAL ENCOUNTER (OUTPATIENT)
Dept: RADIOLOGY | Facility: MEDICAL CENTER | Age: 34
End: 2024-01-11
Attending: NURSE PRACTITIONER
Payer: COMMERCIAL

## 2024-01-11 DIAGNOSIS — R22.1 NECK FULLNESS: ICD-10-CM

## 2024-01-11 PROCEDURE — 76536 US EXAM OF HEAD AND NECK: CPT

## 2024-02-08 ENCOUNTER — APPOINTMENT (OUTPATIENT)
Dept: RHEUMATOLOGY | Facility: MEDICAL CENTER | Age: 34
End: 2024-02-08
Attending: STUDENT IN AN ORGANIZED HEALTH CARE EDUCATION/TRAINING PROGRAM
Payer: COMMERCIAL

## 2024-02-23 ENCOUNTER — APPOINTMENT (OUTPATIENT)
Dept: URGENT CARE | Facility: PHYSICIAN GROUP | Age: 34
End: 2024-02-23
Payer: COMMERCIAL

## 2024-02-23 ENCOUNTER — APPOINTMENT (OUTPATIENT)
Dept: RADIOLOGY | Facility: IMAGING CENTER | Age: 34
End: 2024-02-23
Attending: PHYSICIAN ASSISTANT
Payer: COMMERCIAL

## 2024-02-23 ENCOUNTER — OFFICE VISIT (OUTPATIENT)
Dept: URGENT CARE | Facility: PHYSICIAN GROUP | Age: 34
End: 2024-02-23
Payer: COMMERCIAL

## 2024-02-23 VITALS
DIASTOLIC BLOOD PRESSURE: 74 MMHG | BODY MASS INDEX: 29.66 KG/M2 | TEMPERATURE: 97.3 F | HEART RATE: 74 BPM | RESPIRATION RATE: 16 BRPM | SYSTOLIC BLOOD PRESSURE: 108 MMHG | WEIGHT: 178 LBS | OXYGEN SATURATION: 100 % | HEIGHT: 65 IN

## 2024-02-23 DIAGNOSIS — S67.21XA CRUSHING INJURY OF RIGHT HAND, INITIAL ENCOUNTER: ICD-10-CM

## 2024-02-23 DIAGNOSIS — S60.221A CONTUSION OF RIGHT HAND, INITIAL ENCOUNTER: ICD-10-CM

## 2024-02-23 PROCEDURE — 3074F SYST BP LT 130 MM HG: CPT | Performed by: PHYSICIAN ASSISTANT

## 2024-02-23 PROCEDURE — 99214 OFFICE O/P EST MOD 30 MIN: CPT | Performed by: PHYSICIAN ASSISTANT

## 2024-02-23 PROCEDURE — 3078F DIAST BP <80 MM HG: CPT | Performed by: PHYSICIAN ASSISTANT

## 2024-02-23 PROCEDURE — 73130 X-RAY EXAM OF HAND: CPT | Mod: TC,FY,RT | Performed by: RADIOLOGY

## 2024-02-23 ASSESSMENT — ENCOUNTER SYMPTOMS
CARDIOVASCULAR NEGATIVE: 1
JOINT SWELLING: 1
RESPIRATORY NEGATIVE: 1
NEUROLOGICAL NEGATIVE: 1
CONSTITUTIONAL NEGATIVE: 1

## 2024-02-23 ASSESSMENT — FIBROSIS 4 INDEX: FIB4 SCORE: 0.46

## 2024-02-24 NOTE — PROGRESS NOTES
Subjective     Madhavi Martinez is a very pleasant 34 y.o. female who presents with Hand Injury ((R) x this am pt. Smashed her hand in truck door. )            Patient smashed her right hand in a car door today.  There is a superficial abrasion.  Pain, swelling and bruising at the base of the second through fourth digit of the right hand.  No numbness tingling or previous injury.  Tetanus up-to-date.    Hand Injury  This is a new problem. The current episode started today. The problem occurs constantly. The problem has been unchanged. Associated symptoms include joint swelling. She has tried nothing for the symptoms. The treatment provided no relief.       PMH:  has a past medical history of Anxiety, Depression, Dermatographia, Migraine, and Rheumatoid arthritis (HCC).  MEDS:   Current Outpatient Medications:     baclofen (LIORESAL) 10 MG Tab, Take 0.5-1 Tablets by mouth 2 times a day., Disp: 60 Tablet, Rfl: 1    sertraline (ZOLOFT) 50 MG Tab, Take 1 Tablet by mouth every day., Disp: 90 Tablet, Rfl: 1    naproxen (NAPROSYN) 500 MG Tab, Take 1 Tablet by mouth 2 times daily with meals as needed (for arthritis)., Disp: 60 Tablet, Rfl: 5    leflunomide (ARAVA) 10 MG Tab, Take 1 Tablet by mouth every day., Disp: 90 Tablet, Rfl: 3    hydrOXYzine HCl (ATARAX) 25 MG Tab, Take 1 Tablet by mouth 3 times a day as needed for Itching., Disp: 30 Tablet, Rfl: 3    traZODone (DESYREL) 50 MG Tab, Take 1 Tablet by mouth at bedtime as needed for Sleep., Disp: 90 Tablet, Rfl: 1    folic acid (FOLVITE) 1 MG Tab, Take 1 mg by mouth every day., Disp: , Rfl:   ALLERGIES: No Known Allergies  SURGHX: No past surgical history on file.  SOCHX:  reports that she has never smoked. She has never used smokeless tobacco. She reports current alcohol use of about 1.2 oz of alcohol per week. She reports current drug use. Drugs: Marijuana and Oral.  FH: family history includes Cancer in her maternal grandfather and paternal grandmother; No Known  "Problems in her paternal grandfather.      Review of Systems   Constitutional: Negative.    Respiratory: Negative.     Cardiovascular: Negative.    Musculoskeletal:  Positive for joint pain and joint swelling.   Neurological: Negative.        Medications, Allergies, and current problem list reviewed today in Epic           Objective     /74   Pulse 74   Temp 36.3 °C (97.3 °F) (Temporal)   Resp 16   Ht 1.651 m (5' 5\")   Wt 80.7 kg (178 lb)   LMP 02/14/2024   SpO2 100%   BMI 29.62 kg/m²      Physical Exam  Vitals and nursing note reviewed.   Constitutional:       General: She is not in acute distress.     Appearance: Normal appearance. She is well-developed. She is not ill-appearing, toxic-appearing or diaphoretic.   HENT:      Head: Normocephalic and atraumatic.      Right Ear: Hearing and external ear normal.      Left Ear: Hearing and external ear normal.      Nose: Nose normal.   Eyes:      General:         Right eye: No discharge.         Left eye: No discharge.      Conjunctiva/sclera: Conjunctivae normal.   Cardiovascular:      Rate and Rhythm: Normal rate and regular rhythm.      Heart sounds: Normal heart sounds.   Pulmonary:      Effort: Pulmonary effort is normal. No respiratory distress.      Breath sounds: Normal breath sounds. No wheezing.   Musculoskeletal:      Right wrist: Normal.      Right hand: Swelling, tenderness and bony tenderness present. No deformity. Decreased range of motion. Normal strength. Normal sensation. There is no disruption of two-point discrimination. Normal capillary refill. Normal pulse.        Hands:       Cervical back: Normal range of motion and neck supple.      Comments: Superficial abrasion noted   Skin:     General: Skin is warm and dry.   Neurological:      General: No focal deficit present.      Mental Status: She is alert and oriented to person, place, and time.   Psychiatric:         Mood and Affect: Mood normal.         Behavior: Behavior normal.        "  Thought Content: Thought content normal.         Judgment: Judgment normal.                             Assessment & Plan     1. Crushing injury of right hand, initial encounter  DX-HAND 3+ RIGHT      2. Contusion of right hand, initial encounter          This is a very pleasant 34-year-old female presenting after a right hand crush injury in the car door few hours ago.  Pain swelling and bruising noted.  X-ray was negative for fracture.  Hand was wrapped in clinic.  OTC meds and conservative measures.  RICE therapy as discussed.    RICE TREATMENT FOR EXTREMITY INJURIES:  R-rest the extremity as much as possible while pain and swelling persist  I-ice the extremity 15 minutes every 2 hours for the first 24 hours, then 4-5 times daily   C-compress the extremity either with splint or ace wrap as directed  E-elevate the extremity to help with swelling      I personally reviewed prior external notes and test results pertinent to today's visit. Return to clinic or go to ED if symptoms worsen or persist. Red flag symptoms and indications for ED discussed at length. Patient/Parent/Guardian voices understanding.  AVS with post-visit instructions provided or given verbally.  Follow-up with your primary care provider in 3-5 days. All side effects and potential interactions of prescribed medication discussed including allergic response, GI upset, tendon injury, rash, sedation, OCP effectiveness, etc.    Please note that this dictation was created using voice recognition software. I have made every reasonable attempt to correct obvious errors, but I expect that there are errors of grammar and possibly content that I did not discover before finalizing the note.

## 2024-02-29 ENCOUNTER — APPOINTMENT (OUTPATIENT)
Dept: MEDICAL GROUP | Facility: MEDICAL CENTER | Age: 34
End: 2024-02-29
Payer: COMMERCIAL

## 2024-03-14 ENCOUNTER — HOSPITAL ENCOUNTER (OUTPATIENT)
Dept: LAB | Facility: MEDICAL CENTER | Age: 34
End: 2024-03-14
Attending: STUDENT IN AN ORGANIZED HEALTH CARE EDUCATION/TRAINING PROGRAM
Payer: COMMERCIAL

## 2024-03-14 DIAGNOSIS — E55.9 VITAMIN D INSUFFICIENCY: ICD-10-CM

## 2024-03-14 DIAGNOSIS — D84.9 IMMUNOSUPPRESSED STATUS (HCC): ICD-10-CM

## 2024-03-14 DIAGNOSIS — M05.9 SEROPOSITIVE RHEUMATOID ARTHRITIS (HCC): Chronic | ICD-10-CM

## 2024-03-14 LAB
25(OH)D3 SERPL-MCNC: 17 NG/ML (ref 30–100)
ALBUMIN SERPL BCP-MCNC: 4.6 G/DL (ref 3.2–4.9)
ALBUMIN/GLOB SERPL: 1.6 G/DL
ALP SERPL-CCNC: 76 U/L (ref 30–99)
ALT SERPL-CCNC: 15 U/L (ref 2–50)
ANION GAP SERPL CALC-SCNC: 13 MMOL/L (ref 7–16)
AST SERPL-CCNC: 19 U/L (ref 12–45)
BILIRUB SERPL-MCNC: 0.7 MG/DL (ref 0.1–1.5)
BUN SERPL-MCNC: 12 MG/DL (ref 8–22)
CALCIUM ALBUM COR SERPL-MCNC: 8.7 MG/DL (ref 8.5–10.5)
CALCIUM SERPL-MCNC: 9.2 MG/DL (ref 8.5–10.5)
CHLORIDE SERPL-SCNC: 105 MMOL/L (ref 96–112)
CO2 SERPL-SCNC: 22 MMOL/L (ref 20–33)
CREAT SERPL-MCNC: 0.62 MG/DL (ref 0.5–1.4)
CRP SERPL HS-MCNC: 0.55 MG/DL (ref 0–0.75)
ERYTHROCYTE [SEDIMENTATION RATE] IN BLOOD BY WESTERGREN METHOD: 8 MM/HOUR (ref 0–25)
GFR SERPLBLD CREATININE-BSD FMLA CKD-EPI: 121 ML/MIN/1.73 M 2
GLOBULIN SER CALC-MCNC: 2.9 G/DL (ref 1.9–3.5)
GLUCOSE SERPL-MCNC: 99 MG/DL (ref 65–99)
POTASSIUM SERPL-SCNC: 3.8 MMOL/L (ref 3.6–5.5)
PROT SERPL-MCNC: 7.5 G/DL (ref 6–8.2)
SODIUM SERPL-SCNC: 140 MMOL/L (ref 135–145)

## 2024-03-14 PROCEDURE — 80053 COMPREHEN METABOLIC PANEL: CPT

## 2024-03-14 PROCEDURE — 36415 COLL VENOUS BLD VENIPUNCTURE: CPT

## 2024-03-14 PROCEDURE — 85025 COMPLETE CBC W/AUTO DIFF WBC: CPT

## 2024-03-14 PROCEDURE — 85652 RBC SED RATE AUTOMATED: CPT

## 2024-03-14 PROCEDURE — 86140 C-REACTIVE PROTEIN: CPT

## 2024-03-14 PROCEDURE — 83516 IMMUNOASSAY NONANTIBODY: CPT

## 2024-03-14 PROCEDURE — 82306 VITAMIN D 25 HYDROXY: CPT

## 2024-03-14 PROCEDURE — 86200 CCP ANTIBODY: CPT

## 2024-03-14 PROCEDURE — 86431 RHEUMATOID FACTOR QUANT: CPT

## 2024-03-15 ENCOUNTER — APPOINTMENT (OUTPATIENT)
Dept: MEDICAL GROUP | Facility: MEDICAL CENTER | Age: 34
End: 2024-03-15
Payer: COMMERCIAL

## 2024-03-15 LAB
BASOPHILS # BLD AUTO: 0.4 % (ref 0–1.8)
BASOPHILS # BLD: 0.04 K/UL (ref 0–0.12)
EOSINOPHIL # BLD AUTO: 0.19 K/UL (ref 0–0.51)
EOSINOPHIL NFR BLD: 1.8 % (ref 0–6.9)
ERYTHROCYTE [DISTWIDTH] IN BLOOD BY AUTOMATED COUNT: 47.2 FL (ref 35.9–50)
HCT VFR BLD AUTO: 40 % (ref 37–47)
HGB BLD-MCNC: 13.2 G/DL (ref 12–16)
IMM GRANULOCYTES # BLD AUTO: 0.02 K/UL (ref 0–0.11)
IMM GRANULOCYTES NFR BLD AUTO: 0.2 % (ref 0–0.9)
LYMPHOCYTES # BLD AUTO: 2.33 K/UL (ref 1–4.8)
LYMPHOCYTES NFR BLD: 21.9 % (ref 22–41)
MCH RBC QN AUTO: 30.1 PG (ref 27–33)
MCHC RBC AUTO-ENTMCNC: 33 G/DL (ref 32.2–35.5)
MCV RBC AUTO: 91.3 FL (ref 81.4–97.8)
MONOCYTES # BLD AUTO: 0.6 K/UL (ref 0–0.85)
MONOCYTES NFR BLD AUTO: 5.6 % (ref 0–13.4)
NEUTROPHILS # BLD AUTO: 7.47 K/UL (ref 1.82–7.42)
NEUTROPHILS NFR BLD: 70.1 % (ref 44–72)
NRBC # BLD AUTO: 0 K/UL
NRBC BLD-RTO: 0 /100 WBC (ref 0–0.2)
PLATELET # BLD AUTO: 294 K/UL (ref 164–446)
PMV BLD AUTO: 10.7 FL (ref 9–12.9)
RBC # BLD AUTO: 4.38 M/UL (ref 4.2–5.4)
WBC # BLD AUTO: 10.7 K/UL (ref 4.8–10.8)

## 2024-03-17 LAB
CARBAMYLATED PROTEIN ANTIBODY, IGG Q6043: 15 UNITS (ref 0–19)
CCP IGA+IGG SERPL IA-ACNC: 3 UNITS (ref 0–19)
RHEUMATOID FACT SER NEPH-ACNC: <10 IU/ML (ref 0–14)

## 2024-05-30 ENCOUNTER — OFFICE VISIT (OUTPATIENT)
Dept: MEDICAL GROUP | Facility: MEDICAL CENTER | Age: 34
End: 2024-05-30
Payer: COMMERCIAL

## 2024-05-30 VITALS
SYSTOLIC BLOOD PRESSURE: 118 MMHG | RESPIRATION RATE: 16 BRPM | WEIGHT: 173 LBS | BODY MASS INDEX: 28.82 KG/M2 | TEMPERATURE: 97.3 F | HEIGHT: 65 IN | HEART RATE: 64 BPM | OXYGEN SATURATION: 99 % | DIASTOLIC BLOOD PRESSURE: 70 MMHG

## 2024-05-30 DIAGNOSIS — M54.2 NECK AND SHOULDER PAIN: ICD-10-CM

## 2024-05-30 DIAGNOSIS — M62.89 MUSCLE TIGHTNESS: ICD-10-CM

## 2024-05-30 DIAGNOSIS — M25.519 NECK AND SHOULDER PAIN: ICD-10-CM

## 2024-05-30 DIAGNOSIS — Z02.89 ENCOUNTER FOR COMPLETION OF FORM WITH PATIENT: ICD-10-CM

## 2024-05-30 DIAGNOSIS — R51.9 NONINTRACTABLE HEADACHE, UNSPECIFIED CHRONICITY PATTERN, UNSPECIFIED HEADACHE TYPE: ICD-10-CM

## 2024-05-30 DIAGNOSIS — M54.50 LUMBAR PAIN: ICD-10-CM

## 2024-05-30 DIAGNOSIS — E55.9 VITAMIN D DEFICIENCY: ICD-10-CM

## 2024-05-30 ASSESSMENT — PATIENT HEALTH QUESTIONNAIRE - PHQ9
5. POOR APPETITE OR OVEREATING: 2 - MORE THAN HALF THE DAYS
CLINICAL INTERPRETATION OF PHQ2 SCORE: 3
SUM OF ALL RESPONSES TO PHQ QUESTIONS 1-9: 12

## 2024-05-30 ASSESSMENT — FIBROSIS 4 INDEX: FIB4 SCORE: 0.53

## 2024-05-30 NOTE — PROGRESS NOTES
Subjective:     Madhavi Martinez is a 34 y.o. female presents to discuss:   Chief Complaint   Patient presents with    Follow-Up     Verbal consent was acquired by the patient to use [employer] ambient listening note generation during this visit Yes     History of Present Illness  The patient presents for FMLA paperwork.    Patient reports that she was able to follow-up with neurology and started on Emgality injections she states that she has noticed an improvement in the intensity of her headaches.  She has an upcoming MRI of her brain and cervical spine ordered.  Have reviewed recent cervical spine x-ray.  She continues to endorse significant upper neck, shoulder and back tension.  She also reports lower back tightness.  Is not been able to get into formal physical therapy just yet however she has been using physical therapy that is provided by her employer.  Baclofen has been trialed, but it induces sleepiness but does not alleviate symptoms. The patient's pain and pressure in the spine are radiating upward.  The patient has initiated physical therapy through a work-related program targeting the lower back, but reports a lack of full range of motion and a sensation of tightness in the entire lower back.   The patient has been participating in a free and virtual therapy programs, which have proven beneficial. The patient's sleep has improved, but the patient reports a sensation of bone loss and muscle mass. An MRI of the C-spine is scheduled for the following week. The patient occasionally experiences a pinching sensation in the neck while sitting, necessitating immediate movement. The patient's neurologist has recommended monthly massages as a self-care strategy. The patient's occupation is physically demanding.     Supplemental Information  The patient is scheduled to get a 24-hour EEG done today      She is requesting to have FMLA updated.  She will need to provide us with the paperwork.  I have printed  "her old FMLA and have reviewed this with her-I will complete this when she has provided us with the updated form.    ROS: : see above      Current Outpatient Medications:     baclofen (LIORESAL) 10 MG Tab, Take 0.5-1 Tablets by mouth 2 times a day., Disp: 60 Tablet, Rfl: 1    sertraline (ZOLOFT) 50 MG Tab, Take 1 Tablet by mouth every day., Disp: 90 Tablet, Rfl: 1    naproxen (NAPROSYN) 500 MG Tab, Take 1 Tablet by mouth 2 times daily with meals as needed (for arthritis)., Disp: 60 Tablet, Rfl: 5    leflunomide (ARAVA) 10 MG Tab, Take 1 Tablet by mouth every day., Disp: 90 Tablet, Rfl: 3    hydrOXYzine HCl (ATARAX) 25 MG Tab, Take 1 Tablet by mouth 3 times a day as needed for Itching., Disp: 30 Tablet, Rfl: 3    traZODone (DESYREL) 50 MG Tab, Take 1 Tablet by mouth at bedtime as needed for Sleep., Disp: 90 Tablet, Rfl: 1    folic acid (FOLVITE) 1 MG Tab, Take 1 mg by mouth every day., Disp: , Rfl:     No Known Allergies    Objective:     Vitals: /70   Pulse 64   Temp 36.3 °C (97.3 °F)   Resp 16   Ht 1.651 m (5' 5\")   Wt 78.5 kg (173 lb)   SpO2 99%   BMI 28.79 kg/m²    General: Alert, pleasant, NAD  HEENT: Normocephalic.  Neck supple.   Respiratory: no distress, no audible wheezing, RR -WNL  Skin: Warm, dry, no rashes.  Extremities: No leg edema. No discoloration  Neurological: No tremors  Psych:  Affect/mood is normal, judgement is good, memory is intact, grooming is appropriate.      Assessment/Plan:      Assessment & Plan  1. FMLA paperwork.  The patient was presented with the necessary paperwork for FMLA.    1. Nonintractable headache, unspecified chronicity pattern, unspecified headache type  Chronic.  Had some improvement with Emgality.  Currently being followed by neurology.  Pending MRI brain, cervical spine MRI.    2. Neck and shoulder pain  3. Muscle tightness  4. Lumbar pain  Chronic.  Suspect her current place of employment and job requirements do cause a significant amount of increased neck " and shoulder and muscle tightness.  Recommend massage, heat, Epsom salt soaks.  May trial half a tablet of the baclofen as a full tablet caused her some drowsiness.  ?  Trigger point injections  Defer lumbar x-ray at this time.    5. Vitamin D deficiency  Noted on last  labs recommend supplementation 2000 to 4000 IU daily.    6. Encounter for completion of form with patient   -I will complete the necessary [facility] paperwork once patient has been able to provide this to us.  It is a renewal from her previous [facility] paperwork which I have reviewed with her in today's visit.    Return if symptoms worsen or fail to improve.    Anabella ELI.

## 2024-06-06 ENCOUNTER — HOSPITAL ENCOUNTER (OUTPATIENT)
Dept: LAB | Facility: MEDICAL CENTER | Age: 34
End: 2024-06-06
Attending: PSYCHIATRY & NEUROLOGY
Payer: COMMERCIAL

## 2024-06-06 LAB
CK SERPL-CCNC: 80 U/L (ref 0–154)
EST. AVERAGE GLUCOSE BLD GHB EST-MCNC: 103 MG/DL
FOLATE SERPL-MCNC: 8.2 NG/ML
HBA1C MFR BLD: 5.2 % (ref 4–5.6)
T PALLIDUM AB SER QL IA: NORMAL
VIT B12 SERPL-MCNC: 409 PG/ML (ref 211–911)

## 2024-06-06 PROCEDURE — 83036 HEMOGLOBIN GLYCOSYLATED A1C: CPT

## 2024-06-06 PROCEDURE — 82746 ASSAY OF FOLIC ACID SERUM: CPT

## 2024-06-06 PROCEDURE — 84207 ASSAY OF VITAMIN B-6: CPT

## 2024-06-06 PROCEDURE — 86780 TREPONEMA PALLIDUM: CPT

## 2024-06-06 PROCEDURE — 82550 ASSAY OF CK (CPK): CPT

## 2024-06-06 PROCEDURE — 83825 ASSAY OF MERCURY: CPT

## 2024-06-06 PROCEDURE — 86235 NUCLEAR ANTIGEN ANTIBODY: CPT

## 2024-06-06 PROCEDURE — 82175 ASSAY OF ARSENIC: CPT

## 2024-06-06 PROCEDURE — 82607 VITAMIN B-12: CPT

## 2024-06-06 PROCEDURE — 82300 ASSAY OF CADMIUM: CPT

## 2024-06-06 PROCEDURE — 82085 ASSAY OF ALDOLASE: CPT

## 2024-06-06 PROCEDURE — 84425 ASSAY OF VITAMIN B-1: CPT

## 2024-06-06 PROCEDURE — 36415 COLL VENOUS BLD VENIPUNCTURE: CPT

## 2024-06-06 PROCEDURE — 83655 ASSAY OF LEAD: CPT

## 2024-06-08 LAB
ALDOLASE SERPL-CCNC: 6.2 U/L (ref 1.2–7.6)
ARSENIC BLD-MCNC: <10 UG/L
CADMIUM BLD-MCNC: <1 UG/L
LEAD BLDV-MCNC: <2 UG/DL
MERCURY BLD-MCNC: <2.5 UG/L

## 2024-06-09 LAB — ENA JO1 AB TITR SER: 0 AU/ML (ref 0–40)

## 2024-06-10 LAB — VIT B6 SERPL-MCNC: 33.9 NMOL/L (ref 20–125)

## 2024-06-11 LAB — VIT B1 BLD-MCNC: 154 NMOL/L (ref 70–180)

## 2024-07-05 DIAGNOSIS — D22.9 CHANGE IN MULTIPLE NEVI: ICD-10-CM

## 2024-07-06 ENCOUNTER — HOSPITAL ENCOUNTER (OUTPATIENT)
Dept: RADIOLOGY | Facility: MEDICAL CENTER | Age: 34
End: 2024-07-06
Attending: PSYCHIATRY & NEUROLOGY
Payer: COMMERCIAL

## 2024-07-06 DIAGNOSIS — G43.011 MIGRAINE WITHOUT AURA, WITH INTRACTABLE MIGRAINE, SO STATED, WITH STATUS MIGRAINOSUS: ICD-10-CM

## 2024-07-06 DIAGNOSIS — M50.20 DISPLACEMENT OF CERVICAL INTERVERTEBRAL DISC WITHOUT MYELOPATHY: ICD-10-CM

## 2024-07-06 DIAGNOSIS — R20.2 PARESTHESIA: ICD-10-CM

## 2024-07-06 DIAGNOSIS — R56.9 CONVULSIONS, UNSPECIFIED CONVULSION TYPE (HCC): ICD-10-CM

## 2024-07-06 DIAGNOSIS — R41.3 MEMORY LOSS: ICD-10-CM

## 2024-07-06 DIAGNOSIS — M54.12 BRACHIAL NEURITIS: ICD-10-CM

## 2024-07-06 PROCEDURE — A9579 GAD-BASE MR CONTRAST NOS,1ML: HCPCS | Mod: JZ | Performed by: PSYCHIATRY & NEUROLOGY

## 2024-07-06 PROCEDURE — 70553 MRI BRAIN STEM W/O & W/DYE: CPT

## 2024-07-06 PROCEDURE — 72141 MRI NECK SPINE W/O DYE: CPT

## 2024-07-06 PROCEDURE — 700117 HCHG RX CONTRAST REV CODE 255: Mod: JZ | Performed by: PSYCHIATRY & NEUROLOGY

## 2024-07-06 RX ADMIN — GADOTERIDOL 15 ML: 279.3 INJECTION, SOLUTION INTRAVENOUS at 18:40

## 2024-07-17 ENCOUNTER — APPOINTMENT (RX ONLY)
Dept: URBAN - METROPOLITAN AREA CLINIC 22 | Facility: CLINIC | Age: 34
Setting detail: DERMATOLOGY
End: 2024-07-17

## 2024-07-17 DIAGNOSIS — L81.4 OTHER MELANIN HYPERPIGMENTATION: ICD-10-CM

## 2024-07-17 DIAGNOSIS — Z71.89 OTHER SPECIFIED COUNSELING: ICD-10-CM

## 2024-07-17 DIAGNOSIS — D22 MELANOCYTIC NEVI: ICD-10-CM

## 2024-07-17 PROBLEM — D22.62 MELANOCYTIC NEVI OF LEFT UPPER LIMB, INCLUDING SHOULDER: Status: ACTIVE | Noted: 2024-07-17

## 2024-07-17 PROBLEM — D22.5 MELANOCYTIC NEVI OF TRUNK: Status: ACTIVE | Noted: 2024-07-17

## 2024-07-17 PROBLEM — D22.61 MELANOCYTIC NEVI OF RIGHT UPPER LIMB, INCLUDING SHOULDER: Status: ACTIVE | Noted: 2024-07-17

## 2024-07-17 PROBLEM — D48.5 NEOPLASM OF UNCERTAIN BEHAVIOR OF SKIN: Status: ACTIVE | Noted: 2024-07-17

## 2024-07-17 PROBLEM — D22.39 MELANOCYTIC NEVI OF OTHER PARTS OF FACE: Status: ACTIVE | Noted: 2024-07-17

## 2024-07-17 PROCEDURE — 11103 TANGNTL BX SKIN EA SEP/ADDL: CPT

## 2024-07-17 PROCEDURE — 11102 TANGNTL BX SKIN SINGLE LES: CPT

## 2024-07-17 PROCEDURE — ? COUNSELING

## 2024-07-17 PROCEDURE — ? BIOPSY BY SHAVE METHOD

## 2024-07-17 PROCEDURE — 99213 OFFICE O/P EST LOW 20 MIN: CPT | Mod: 25

## 2024-07-17 ASSESSMENT — LOCATION DETAILED DESCRIPTION DERM
LOCATION DETAILED: LEFT INFERIOR MEDIAL MALAR CHEEK
LOCATION DETAILED: RIGHT ANTERIOR PROXIMAL UPPER ARM
LOCATION DETAILED: RIGHT INFERIOR UPPER BACK
LOCATION DETAILED: LEFT ANTERIOR PROXIMAL UPPER ARM
LOCATION DETAILED: LEFT SUPERIOR UPPER BACK
LOCATION DETAILED: RIGHT SUPERIOR LATERAL MIDBACK
LOCATION DETAILED: RIGHT INFERIOR CENTRAL MALAR CHEEK

## 2024-07-17 ASSESSMENT — LOCATION ZONE DERM
LOCATION ZONE: FACE
LOCATION ZONE: TRUNK
LOCATION ZONE: ARM

## 2024-07-17 ASSESSMENT — LOCATION SIMPLE DESCRIPTION DERM
LOCATION SIMPLE: LEFT UPPER BACK
LOCATION SIMPLE: RIGHT CHEEK
LOCATION SIMPLE: RIGHT UPPER BACK
LOCATION SIMPLE: LEFT CHEEK
LOCATION SIMPLE: LEFT UPPER ARM
LOCATION SIMPLE: RIGHT UPPER ARM
LOCATION SIMPLE: RIGHT LOWER BACK

## 2024-08-05 DIAGNOSIS — M25.519 NECK AND SHOULDER PAIN: ICD-10-CM

## 2024-08-05 DIAGNOSIS — M54.50 LUMBAR PAIN: ICD-10-CM

## 2024-08-05 DIAGNOSIS — M54.2 NECK AND SHOULDER PAIN: ICD-10-CM

## 2024-08-05 DIAGNOSIS — M62.89 MUSCLE TIGHTNESS: ICD-10-CM

## 2024-08-06 RX ORDER — BACLOFEN 10 MG/1
TABLET ORAL
Qty: 60 TABLET | Refills: 1 | Status: SHIPPED | OUTPATIENT
Start: 2024-08-06

## 2025-02-06 ENCOUNTER — HOSPITAL ENCOUNTER (OUTPATIENT)
Dept: LAB | Facility: MEDICAL CENTER | Age: 35
End: 2025-02-06
Attending: NURSE PRACTITIONER
Payer: COMMERCIAL

## 2025-02-06 DIAGNOSIS — F33.1 MODERATE EPISODE OF RECURRENT MAJOR DEPRESSIVE DISORDER (HCC): ICD-10-CM

## 2025-02-06 DIAGNOSIS — M62.89 MUSCLE TIGHTNESS: ICD-10-CM

## 2025-02-06 DIAGNOSIS — M54.50 LUMBAR PAIN: ICD-10-CM

## 2025-02-06 DIAGNOSIS — M25.519 NECK AND SHOULDER PAIN: ICD-10-CM

## 2025-02-06 DIAGNOSIS — F41.1 GAD (GENERALIZED ANXIETY DISORDER): ICD-10-CM

## 2025-02-06 DIAGNOSIS — M54.2 NECK AND SHOULDER PAIN: ICD-10-CM

## 2025-02-06 LAB
ALBUMIN SERPL BCP-MCNC: 4.2 G/DL (ref 3.2–4.9)
ALBUMIN/GLOB SERPL: 1.6 G/DL
ALP SERPL-CCNC: 69 U/L (ref 30–99)
ALT SERPL-CCNC: 16 U/L (ref 2–50)
ANION GAP SERPL CALC-SCNC: 8 MMOL/L (ref 7–16)
AST SERPL-CCNC: 18 U/L (ref 12–45)
BILIRUB SERPL-MCNC: 0.4 MG/DL (ref 0.1–1.5)
BUN SERPL-MCNC: 11 MG/DL (ref 8–22)
CALCIUM ALBUM COR SERPL-MCNC: 9 MG/DL (ref 8.5–10.5)
CALCIUM SERPL-MCNC: 9.2 MG/DL (ref 8.5–10.5)
CHLORIDE SERPL-SCNC: 106 MMOL/L (ref 96–112)
CO2 SERPL-SCNC: 24 MMOL/L (ref 20–33)
CREAT SERPL-MCNC: 0.68 MG/DL (ref 0.5–1.4)
GFR SERPLBLD CREATININE-BSD FMLA CKD-EPI: 118 ML/MIN/1.73 M 2
GLOBULIN SER CALC-MCNC: 2.7 G/DL (ref 1.9–3.5)
GLUCOSE SERPL-MCNC: 97 MG/DL (ref 65–99)
POTASSIUM SERPL-SCNC: 4.1 MMOL/L (ref 3.6–5.5)
PROT SERPL-MCNC: 6.9 G/DL (ref 6–8.2)
SODIUM SERPL-SCNC: 138 MMOL/L (ref 135–145)
VIT B12 SERPL-MCNC: 422 PG/ML (ref 211–911)

## 2025-02-06 PROCEDURE — 82607 VITAMIN B-12: CPT

## 2025-02-06 PROCEDURE — 36415 COLL VENOUS BLD VENIPUNCTURE: CPT

## 2025-02-06 PROCEDURE — 80053 COMPREHEN METABOLIC PANEL: CPT

## 2025-02-07 RX ORDER — BACLOFEN 10 MG/1
10 TABLET ORAL 3 TIMES DAILY
Qty: 90 TABLET | Refills: 3 | Status: SHIPPED | OUTPATIENT
Start: 2025-02-07

## 2025-02-07 NOTE — TELEPHONE ENCOUNTER
Received request via: Pharmacy    Was the patient seen in the last year in this department? Yes    Does the patient have an active prescription (recently filled or refills available) for medication(s) requested? No    Pharmacy Name:   [id number] - [place], [place] - [address]  [address]  [place] [address]  Phone: [phone] Fax: [phone]      Does the patient have long term Plus and need 100-day supply? (This applies to ALL medications) Patient does not have SCP

## 2025-04-16 ENCOUNTER — OFFICE VISIT (OUTPATIENT)
Dept: MEDICAL GROUP | Facility: MEDICAL CENTER | Age: 35
End: 2025-04-16
Payer: COMMERCIAL

## 2025-04-16 VITALS
HEIGHT: 65 IN | OXYGEN SATURATION: 97 % | HEART RATE: 77 BPM | DIASTOLIC BLOOD PRESSURE: 50 MMHG | SYSTOLIC BLOOD PRESSURE: 112 MMHG | BODY MASS INDEX: 30.01 KG/M2 | TEMPERATURE: 98.1 F | WEIGHT: 180.12 LBS | RESPIRATION RATE: 16 BRPM

## 2025-04-16 DIAGNOSIS — F41.1 GAD (GENERALIZED ANXIETY DISORDER): ICD-10-CM

## 2025-04-16 DIAGNOSIS — F33.1 MODERATE EPISODE OF RECURRENT MAJOR DEPRESSIVE DISORDER (HCC): ICD-10-CM

## 2025-04-16 DIAGNOSIS — Z13.39 ADHD (ATTENTION DEFICIT HYPERACTIVITY DISORDER) EVALUATION: ICD-10-CM

## 2025-04-16 DIAGNOSIS — R10.2 PELVIC PRESSURE IN FEMALE: ICD-10-CM

## 2025-04-16 DIAGNOSIS — R41.840 INATTENTION: ICD-10-CM

## 2025-04-16 PROCEDURE — 99214 OFFICE O/P EST MOD 30 MIN: CPT | Performed by: NURSE PRACTITIONER

## 2025-04-16 PROCEDURE — 3078F DIAST BP <80 MM HG: CPT | Performed by: NURSE PRACTITIONER

## 2025-04-16 PROCEDURE — 3074F SYST BP LT 130 MM HG: CPT | Performed by: NURSE PRACTITIONER

## 2025-04-16 ASSESSMENT — PATIENT HEALTH QUESTIONNAIRE - PHQ9: CLINICAL INTERPRETATION OF PHQ2 SCORE: 2

## 2025-04-16 ASSESSMENT — FIBROSIS 4 INDEX: FIB4 SCORE: 0.51

## 2025-04-16 NOTE — PROGRESS NOTES
Subjective:     Madhavi Martinez is a 35 y.o. female presents to discuss:     Chief Complaint   Patient presents with    Medication Problem     Verbal consent was acquired by the patient to use [employer] ambient listening note generation during this visit Yes   History of Present Illness  The patient presents for evaluation of depression, ADHD, and left lower pelvic pressure.    A significant improvement in her condition is reported with a reduction in outbursts of anger and dread since starting sertraline. However, ongoing struggles with being alone and difficulty in getting out of bed are noted. Intrusive thoughts are not endorsed. Her work schedule has been altered, resulting in being home alone for 4 days a week due to her children's school commitments. Cleaning activities are engaged in during free time but feelings of unproductivity persist. Outdoor activities provide solace but are limited due to mobility restrictions. Interest in increasing the sertraline dosage to 75 mg is expressed. A previous discontinuation of medication for a period of 3 months led to a severe depressive episode.    Suspicions of undiagnosed ADHD are mentioned, requiring constant stimulation to maintain productivity. An active individual, she feels mentally compelled to be active, leading to feelings of guilt when tasks are not accomplished. Consideration of seeking psychiatric evaluation and medication management is expressed. Behavioral health consultation has not been previously sought. Therapy is currently being received through her workplace.    Lower pelvic pressure, predominantly on the left side, has been experienced for several weeks. The intensity of the pressure varies, sometimes causing her to double over, while at other times it is a mild discomfort. A history of an ovarian cyst rupture on the left side is noted, which occurred during sexual intercourse, leading to fear of engaging in sexual activity.    ROS: : see  "above      Current Outpatient Medications:     sertraline (ZOLOFT) 50 MG Tab, Take 1.5 Tablets by mouth every day., Disp: 135 Tablet, Rfl: 3    baclofen (LIORESAL) 10 MG Tab, Take 1 Tablet by mouth 3 times a day., Disp: 90 Tablet, Rfl: 3    naproxen (NAPROSYN) 500 MG Tab, Take 1 Tablet by mouth 2 times daily with meals as needed (for arthritis)., Disp: 60 Tablet, Rfl: 5    leflunomide (ARAVA) 10 MG Tab, Take 1 Tablet by mouth every day., Disp: 90 Tablet, Rfl: 3    hydrOXYzine HCl (ATARAX) 25 MG Tab, Take 1 Tablet by mouth 3 times a day as needed for Itching., Disp: 30 Tablet, Rfl: 3    traZODone (DESYREL) 50 MG Tab, Take 1 Tablet by mouth at bedtime as needed for Sleep., Disp: 90 Tablet, Rfl: 1    folic acid (FOLVITE) 1 MG Tab, Take 1 mg by mouth every day., Disp: , Rfl:     No Known Allergies    Objective:   Vitals: /50   Pulse 77   Temp 36.7 °C (98.1 °F) (Temporal)   Resp 16   Ht 1.651 m (5' 5\")   Wt 81.7 kg (180 lb 1.9 oz)   SpO2 97%   BMI 29.97 kg/m²    General: Alert, pleasant, NAD  HEENT: Normocephalic.  Neck supple.   Respiratory: no distress, no audible wheezing, RR -WNL  Skin: Warm, dry, no rashes.  Extremities: No leg edema. No discoloration  Neurological: No tremors  Psych:  Affect/mood is normal, judgement is good, memory is intact, grooming is appropriate.  Assessment/Plan:      1. Moderate episode of recurrent major depressive disorder (HCC)  - sertraline (ZOLOFT) 50 MG Tab; Take 1.5 Tablets by mouth every day.  Dispense: 135 Tablet; Refill: 3  - Referral to Behavioral Health    2. CITLALLI (generalized anxiety disorder)  - sertraline (ZOLOFT) 50 MG Tab; Take 1.5 Tablets by mouth every day.  Dispense: 135 Tablet; Refill: 3  - Referral to Behavioral Health    3. ADHD (attention deficit hyperactivity disorder) evaluation  - Referral to Behavioral Health    4. Inattention  - Referral to Behavioral Health    5. Pelvic pressure in female  - US-PELVIC COMPLETE (TRANSABDOMINAL/TRANSVAGINAL) (COMBO); " Future     Assessment & Plan   Depression  Reports improvement in symptoms with the current dose of sertraline but continues to experience difficulty being by herself and getting out of bed.  Diagnostic plan:   Treatment plan: Dosage of sertraline will be increased to 75 mg. Prescription for sertraline 50 mg, with instructions to take 1-1/2 tablets, will be sent to [facility] on [facility]. Advised to continue therapy sessions through work.  Clinical decision making: Discussed the importance of not discontinuing medication abruptly and the potential consequences.     Suspected ADHD  Exhibits symptoms consistent with ADHD but has not been previously diagnosed.  Diagnostic plan: Referral to psychiatry for further evaluation and potential medication management will be made.  Treatment plan: Encouraged to explore community activities that are not physically taxing to help manage symptoms.  Clinical decision making: Discussed the genetic component of ADHD and the importance of evaluation for accurate diagnosis and treatment.     Left lower pelvic pressure  Reports experiencing left lower pelvic pressure for a couple of weeks, sometimes severe enough to cause significant discomfort.  Diagnostic plan: Pelvic ultrasound will be ordered to investigate the cause of the pressure.  Treatment plan: Advised to check [facility] for referral information and follow up if there are any issues.  Clinical decision making: Discussed the potential impact on sexual activity and the importance of addressing the underlying cause.      Return for Annual Preventative Exam.    {I have placed the above orders and discussed them with an approved delegating provider. The MA is performing the below orders under the direction of Dr. Antoni MERCEDES    Health maintenance:    General Recommendations:   Smoking: recommend complete avoidance of all tobacco products  Alcohol: recommend limiting consumption  Physical Activity: goal  is 30 min of moderate activity 5 times a week  Weight Management and Nutrition: high vegetable, high protein diet like the Mediterranean diet, portion control, avoid excessive sugars, Low Glycemic Index foods, adequate hydration, sleep.

## 2025-04-24 NOTE — Clinical Note
REFERRAL APPROVAL NOTICE         Sent on April 24, 2025                   Madhavi Sanders Juan  [address] [place] [address]                   Dear Ms. Martinez,    After a careful review of the medical information and benefit coverage, [facility] has processed your referral. See below for additional details.    If applicable, you must be actively enrolled with your insurance for coverage of the authorized service. If you have any questions regarding your coverage, please contact your insurance directly.    REFERRAL INFORMATION   Referral #:  [id number]  Referred-To Provider    Referred-By Provider:  Behavioral Health    MAGO Sin   [facility] [address]  [place] [address]  [phone] [address]  [facility] [address]  [phone]    Referral Start Date:  04/16/2025  Referral End Date:   04/16/2026             SCHEDULING  If you do not already have an appointment, please call [phone] to make an appointment.     MORE INFORMATION  If you do not already have a [email or web address] account, sign up at: [email or web address]  You can access your medical information, make appointments, see lab results, billing information, and more.  If you have questions regarding this referral, please contact  the [facility] Referrals department at:             [phone]. Monday - Friday 8:00AM - 5:00PM.     Sincerely,    [facility]

## 2025-05-18 ENCOUNTER — OFFICE VISIT (OUTPATIENT)
Dept: URGENT CARE | Facility: PHYSICIAN GROUP | Age: 35
End: 2025-05-18
Payer: COMMERCIAL

## 2025-05-18 VITALS
HEART RATE: 55 BPM | SYSTOLIC BLOOD PRESSURE: 124 MMHG | DIASTOLIC BLOOD PRESSURE: 68 MMHG | BODY MASS INDEX: 30.19 KG/M2 | TEMPERATURE: 98.1 F | WEIGHT: 181.2 LBS | OXYGEN SATURATION: 98 % | RESPIRATION RATE: 16 BRPM | HEIGHT: 65 IN

## 2025-05-18 DIAGNOSIS — H66.91 OTITIS, RIGHT: ICD-10-CM

## 2025-05-18 PROCEDURE — 99213 OFFICE O/P EST LOW 20 MIN: CPT | Performed by: NURSE PRACTITIONER

## 2025-05-18 PROCEDURE — 3078F DIAST BP <80 MM HG: CPT | Performed by: NURSE PRACTITIONER

## 2025-05-18 PROCEDURE — 3074F SYST BP LT 130 MM HG: CPT | Performed by: NURSE PRACTITIONER

## 2025-05-18 RX ORDER — PREGABALIN 75 MG/1
75 CAPSULE ORAL 2 TIMES DAILY
COMMUNITY

## 2025-05-18 ASSESSMENT — VISUAL ACUITY: OU: 1

## 2025-05-18 ASSESSMENT — FIBROSIS 4 INDEX: FIB4 SCORE: 0.51

## 2025-05-18 NOTE — PROGRESS NOTES
Subjective:     Madhavi Martinez is a 35 y.o. female who presents for Otalgia (Pt states ear pain in right ear for three days and now pain in both. )       Otalgia   There is pain in both ears. This is a new problem. The problem has been gradually worsening.     Ambient listening software ([employer]) used during this encounter with the consent of the patient. The following text is AI-assisted:    History of Present Illness  The patient presents for evaluation of ear pressure.    She began experiencing ear pressure a few days ago, initially attributing it to a cough that both she and her son had developed. She describes a sensation of something bouncing between her ears, accompanied by pain. She is uncertain whether this is indicative of a sinus or ear infection. Additionally, she reports a feeling of heaviness in her head when bending down, likening it to the weight of a cement bag. Her hearing remains intact, but she has been experiencing a mild sore throat and a cough. She has not sought relief from over-the-counter medications due to her high pain tolerance from rheumatoid arthritis. She has taken Aleve. These symptoms are new to her, except for the heaviness in her head, which she has previously experienced during sinus or ear infections. She has no known allergies to amoxicillin or Augmentin. She has a history of seasonal allergies, but they are not currently active. She has a history of neurological issues, including a cyst and inflammation under her skull, which she believes may be contributing to her current symptoms.    Review of Systems   HENT:  Positive for ear pain.    All other systems reviewed and are negative.  Refer to HPI for additional details.    During this visit, appropriate PPE was worn, and hand hygiene was performed.    PMH:  has a past medical history of Anxiety, Depression, Dermatographia, Migraine, and Rheumatoid arthritis (HCC).    MEDS:   Current Outpatient Medications:      "pregabalin (LYRICA) 75 MG Cap, Take 75 mg by mouth 2 times a day., Disp: , Rfl:     amoxicillin-clavulanate (AUGMENTIN) 875-125 MG Tab, Take 1 Tablet by mouth 2 times a day for 5 days., Disp: 10 Tablet, Rfl: 0    sertraline (ZOLOFT) 50 MG Tab, Take 1.5 Tablets by mouth every day., Disp: 135 Tablet, Rfl: 3    baclofen (LIORESAL) 10 MG Tab, Take 1 Tablet by mouth 3 times a day., Disp: 90 Tablet, Rfl: 3    naproxen (NAPROSYN) 500 MG Tab, Take 1 Tablet by mouth 2 times daily with meals as needed (for arthritis)., Disp: 60 Tablet, Rfl: 5    hydrOXYzine HCl (ATARAX) 25 MG Tab, Take 1 Tablet by mouth 3 times a day as needed for Itching., Disp: 30 Tablet, Rfl: 3    traZODone (DESYREL) 50 MG Tab, Take 1 Tablet by mouth at bedtime as needed for Sleep., Disp: 90 Tablet, Rfl: 1    folic acid (FOLVITE) 1 MG Tab, Take 1 mg by mouth every day., Disp: , Rfl:     ALLERGIES: No Known Allergies  SURGHX: History reviewed. No pertinent surgical history.  SOCHX:  reports that she has never smoked. She has never used smokeless tobacco. She reports current alcohol use of about 1.2 oz of alcohol per week. She reports current drug use. Drugs: Marijuana and Oral.    FH: Per HPI as applicable/pertinent.      Objective:     /68   Pulse (!) 55   Temp 36.7 °C (98.1 °F) (Temporal)   Resp 16   Ht 1.651 m (5' 5\")   Wt 82.2 kg (181 lb 3.2 oz)   SpO2 98%   BMI 30.15 kg/m²     Physical Exam  Nursing note reviewed.   Constitutional:       General: She is not in acute distress.     Appearance: She is well-developed. She is not ill-appearing or toxic-appearing.   HENT:      Right Ear: Ear canal and external ear normal. Tympanic membrane is bulging (Dull). Tympanic membrane is not perforated.      Left Ear: Tympanic membrane, ear canal and external ear normal. Tympanic membrane is not perforated, erythematous or bulging.      Nose: Nose normal.      Right Sinus: No maxillary sinus tenderness or frontal sinus tenderness.      Left Sinus: No " maxillary sinus tenderness or frontal sinus tenderness.      Mouth/Throat:      Pharynx: Oropharynx is clear. Posterior oropharyngeal erythema present. No pharyngeal swelling.   Eyes:      General: Vision grossly intact.      Extraocular Movements: Extraocular movements intact.      Conjunctiva/sclera: Conjunctivae normal.   Cardiovascular:      Rate and Rhythm: Normal rate and regular rhythm.      Heart sounds: Normal heart sounds.   Pulmonary:      Effort: Pulmonary effort is normal. No respiratory distress.      Breath sounds: Normal breath sounds. No stridor. No decreased breath sounds, wheezing, rhonchi or rales.   Musculoskeletal:         General: No deformity. Normal range of motion.      Cervical back: Normal range of motion.   Skin:     General: Skin is warm and dry.      Coloration: Skin is not pale.   Neurological:      Mental Status: She is alert and oriented to person, place, and time.      Motor: No weakness.   Psychiatric:         Behavior: Behavior normal. Behavior is cooperative.       Assessment/Plan:     1. Otitis, right  - amoxicillin-clavulanate (AUGMENTIN) 875-125 MG Tab; Take 1 Tablet by mouth 2 times a day for 5 days.  Dispense: 10 Tablet; Refill: 0     AI-assisted A&P:   Assessment & Plan  1. Ear pressure  - Symptoms suggest early-stage ear infection, possibly due to sinus pressure [patient amenable to empiric Augmentin x 5 days given associated PMH].  - Exam revealed right ear congestion with dull eardrum  - Discussed eustachian tube and sinus pressure connection, potential viral illness causing sinus and ear congestion  - Advised supportive care with Aleve, rest, hydration, and consider Claritin for symptom management  - If no improvement or worsening, return for further evaluation    Monitor. Warning signs reviewed. Return precautions advised.    Discharge summary provided via [employer].    Differential diagnosis, natural history, supportive care, over-the-counter symptom management per  's instructions, close monitoring, and indications for immediate follow-up discussed.     All questions answered. Patient agrees with the plan of care.

## 2025-05-24 ENCOUNTER — HOSPITAL ENCOUNTER (OUTPATIENT)
Dept: RADIOLOGY | Facility: MEDICAL CENTER | Age: 35
End: 2025-05-24
Attending: NURSE PRACTITIONER
Payer: COMMERCIAL

## 2025-05-24 DIAGNOSIS — R10.2 PELVIC PRESSURE IN FEMALE: ICD-10-CM

## 2025-05-24 PROCEDURE — 76830 TRANSVAGINAL US NON-OB: CPT

## 2025-05-27 ENCOUNTER — RESULTS FOLLOW-UP (OUTPATIENT)
Dept: MEDICAL GROUP | Facility: MEDICAL CENTER | Age: 35
End: 2025-05-27

## 2025-05-27 DIAGNOSIS — N83.299 COMPLEX OVARIAN CYST: Primary | ICD-10-CM

## 2025-06-03 NOTE — Clinical Note
REFERRAL APPROVAL NOTICE         Sent on Addie 3, 2025                   Madhavi Sanders Juan  [address] [place] [address]                   Dear Ms. Martinez,    After a careful review of the medical information and benefit coverage, [facility] has processed your referral. See below for additional details.    If applicable, you must be actively enrolled with your insurance for coverage of the authorized service. If you have any questions regarding your coverage, please contact your insurance directly.    REFERRAL INFORMATION   Referral #:  [id number]  Referred-To Department    Referred-By Provider:  Gynecology    MAGO Sin [facility]      [address]  [address]  [place] [address]  [phone] [address] [facility], [address]  [place] [id number] [phone]    Referral Start Date:  05/27/2025  Referral End Date:   05/27/2026             SCHEDULING  If you do not already have an appointment, please call [phone] to make an appointment.     MORE INFORMATION  If you do not already have a [employer] account, sign up at: [email or web address]  You can access your medical information, make appointments, see lab results, billing information, and more.  If you have questions regarding this referral, please contact  the [facility] Referrals department at:             [phone]. Monday - Friday 8:00AM - 5:00PM.     Sincerely,    [facility]

## 2025-07-01 ENCOUNTER — APPOINTMENT (OUTPATIENT)
Dept: MEDICAL GROUP | Facility: MEDICAL CENTER | Age: 35
End: 2025-07-01
Payer: COMMERCIAL

## 2025-07-01 VITALS
WEIGHT: 181 LBS | OXYGEN SATURATION: 98 % | BODY MASS INDEX: 30.16 KG/M2 | HEIGHT: 65 IN | DIASTOLIC BLOOD PRESSURE: 70 MMHG | RESPIRATION RATE: 14 BRPM | HEART RATE: 64 BPM | SYSTOLIC BLOOD PRESSURE: 118 MMHG | TEMPERATURE: 97.6 F

## 2025-07-01 DIAGNOSIS — F41.1 GAD (GENERALIZED ANXIETY DISORDER): ICD-10-CM

## 2025-07-01 DIAGNOSIS — G43.E09 CHRONIC MIGRAINE WITH AURA WITHOUT STATUS MIGRAINOSUS, NOT INTRACTABLE: ICD-10-CM

## 2025-07-01 DIAGNOSIS — M05.9 SEROPOSITIVE RHEUMATOID ARTHRITIS (HCC): Chronic | ICD-10-CM

## 2025-07-01 DIAGNOSIS — Z02.89 ENCOUNTER FOR COMPLETION OF FORM WITH PATIENT: ICD-10-CM

## 2025-07-01 DIAGNOSIS — F33.1 MODERATE EPISODE OF RECURRENT MAJOR DEPRESSIVE DISORDER (HCC): Primary | ICD-10-CM

## 2025-07-01 PROBLEM — D84.9 IMMUNOSUPPRESSED STATUS (HCC): Chronic | Status: ACTIVE | Noted: 2022-04-28

## 2025-07-01 PROCEDURE — 99214 OFFICE O/P EST MOD 30 MIN: CPT | Performed by: NURSE PRACTITIONER

## 2025-07-01 PROCEDURE — 3078F DIAST BP <80 MM HG: CPT | Performed by: NURSE PRACTITIONER

## 2025-07-01 PROCEDURE — 3074F SYST BP LT 130 MM HG: CPT | Performed by: NURSE PRACTITIONER

## 2025-07-01 ASSESSMENT — PATIENT HEALTH QUESTIONNAIRE - PHQ9
5. POOR APPETITE OR OVEREATING: 1 - SEVERAL DAYS
SUM OF ALL RESPONSES TO PHQ QUESTIONS 1-9: 11
CLINICAL INTERPRETATION OF PHQ2 SCORE: 2

## 2025-07-01 ASSESSMENT — FIBROSIS 4 INDEX: FIB4 SCORE: 0.51

## 2025-07-01 NOTE — PROGRESS NOTES
Subjective:     HPI:     Madhavi Martinez is a 35 y.o. female presents to discuss:   Chief Complaint   Patient presents with    Paperwork     Verbal consent was acquired by the patient to use [employer] ambient listening note generation during this visit Yes     History of Present Illness  The patient presents for FMLA paperwork. She reports that her condition has been stable. She realized that her FMLA had  and had to request an absence on 2025, even though her leave ended on 2025. Her company is now offering her an extension or to backtrack the claim to 2025. She is requesting that the first day missed for this claim be 2025, but the onset date remains the same. She does not require a reduced work schedule as it is based on flare-ups and appointments are scheduled as needed.    She continues to see rheumatology. Her pain is primarily in her neck, which is managed by both neurology and rheumatology. She experiences difficulty moving her neck and has been referred to occupational therapy due to her frequent use of her hands. She anticipates more skin flare-ups during the summer. She has lost her pinky toe due to a severe flare-up but it is regrowing. She also reports memory issues and concentration problems. She experiences episodes of inability to work due to the severity of her symptoms, which occur approximately 5 times per month, lasting about 4 days per episode.     She has an appointment scheduled with gynecology and behavioral health at the end of 2025. She has been diagnosed with occipital neuralgia and migraines with aura. She has been receiving injections for her migraines, which have improved her condition. She no longer feels as if her head is as heavy as a bowling ball. She manages her symptoms with rest, showers, heat pads, and muscle relaxers.    ROS: : see above    Current Medications[1]    Allergies[2]    Objective:     Vitals: /70   Pulse 64   Temp  "36.4 °C (97.6 °F) (Temporal)   Resp 14   Ht 1.651 m (5' 5\")   Wt 82.1 kg (181 lb)   SpO2 98%   BMI 30.12 kg/m²      General: Alert, pleasant, NAD  HEENT: Normocephalic.  Neck supple.   Respiratory: no distress, no audible wheezing, RR -WNL  Skin: Warm, dry, no rashes.  Extremities: No leg edema. No discoloration  Neurological: No tremors  Psych:  Affect/mood is normal, judgement is good, memory is intact, grooming is appropriate.  Results       Assessment/Plan:      1. Encounter for completion of form with patient    2. Moderate episode of recurrent major depressive disorder (HCC)  Chronic. Mood is overall stable. Continue Sertraline 75mg daily.   Keep upcoming appt with     3. CITLALLI (generalized anxiety disorder)  Chronic. Improved with SSRI.   Continue with Sertraline 75mg daily.   Keep upcoming appt with     4. Seropositive rheumatoid arthritis (HCC)  Not on medication at this time.    5. Chronic migraine with aura without status migrainosus, not intractable  -Followed by [facility]  -last OV notes reviewed  Assessment & Plan  1. FMLA paperwork  - FMLA paperwork completed today; condition necessitates follow-up treatment appointments due to medical condition  - Estimated treatment schedule provided  - Requires intermittent leave due to symptoms: joint pain, rash, concentration issues, migraines, swelling, and severe rash  - Waiting for more extensive pain before proceeding with rheumatology medications   - Pain primarily in C-spine,  - Difficulty moving neck; referred to occupational therapy due to frequent hand use  - Anticipates more skin flare-ups during summer  - Reports memory issues and concentration problems  - Episodes of inability to work occur ~5 times/month, lasting ~4 days per episode; episodes rarely exceed 2 days due to learned management strategies  - Does not believe fitness for duty form is needed as she has not returned to work  - Appointment scheduled with gynecology and " behavioral health at end of 08/2025  - Diagnosed with occipital neuralgia and migraines with aura  - Receiving injections for migraines, with improvement noted (no longer feels head is as heavy as a bowling ball)  - During flare episodes, needs to rest at home, reduce activity, and manage symptoms with medications and heat application for joint pain and swelling  - Appointments estimated to occur once per month, each lasting one day    Return for Annual Preventative Exam.    {I have placed the above orders and discussed them with an approved delegating provider. The MA is performing the below orders under the direction of Dr. Corrales    General Recommendations:   Smoking: recommend complete avoidance of all tobacco products  Alcohol: recommend limiting consumption  Physical Activity: goal is 30 min of moderate activity 5 times a week  Weight Management and Nutrition: high vegetable, high protein diet like the Mediterranean diet, portion control, avoid excessive sugars, Low Glycemic Index foods, adequate hydration, sleep.     Please note that this dictation was created using voice recognition software. I have made every reasonable attempt to correct obvious errors, but I expect that there are errors of grammar and possibly content that I did not discover before finalizing the note.     Anabella MERCEDES         [1]   Current Outpatient Medications:     pregabalin (LYRICA) 75 MG Cap, Take 75 mg by mouth 2 times a day., Disp: , Rfl:     sertraline (ZOLOFT) 50 MG Tab, Take 1.5 Tablets by mouth every day., Disp: 135 Tablet, Rfl: 3    baclofen (LIORESAL) 10 MG Tab, Take 1 Tablet by mouth 3 times a day., Disp: 90 Tablet, Rfl: 3    naproxen (NAPROSYN) 500 MG Tab, Take 1 Tablet by mouth 2 times daily with meals as needed (for arthritis)., Disp: 60 Tablet, Rfl: 5    hydrOXYzine HCl (ATARAX) 25 MG Tab, Take 1 Tablet by mouth 3 times a day as needed for Itching., Disp: 30 Tablet, Rfl: 3    traZODone (DESYREL) 50 MG  Tab, Take 1 Tablet by mouth at bedtime as needed for Sleep., Disp: 90 Tablet, Rfl: 1    folic acid (FOLVITE) 1 MG Tab, Take 1 mg by mouth every day., Disp: , Rfl:   [2] No Known Allergies

## 2025-08-07 ENCOUNTER — APPOINTMENT (OUTPATIENT)
Dept: OBGYN | Facility: CLINIC | Age: 35
End: 2025-08-07
Attending: NURSE PRACTITIONER
Payer: COMMERCIAL

## 2025-09-04 ENCOUNTER — APPOINTMENT (OUTPATIENT)
Dept: OBGYN | Facility: CLINIC | Age: 35
End: 2025-09-04
Attending: NURSE PRACTITIONER
Payer: COMMERCIAL